# Patient Record
Sex: FEMALE | Race: WHITE | NOT HISPANIC OR LATINO | Employment: UNEMPLOYED | URBAN - METROPOLITAN AREA
[De-identification: names, ages, dates, MRNs, and addresses within clinical notes are randomized per-mention and may not be internally consistent; named-entity substitution may affect disease eponyms.]

---

## 2023-09-25 ENCOUNTER — APPOINTMENT (OUTPATIENT)
Dept: RADIOLOGY | Facility: MEDICAL CENTER | Age: 23
End: 2023-09-25
Attending: EMERGENCY MEDICINE

## 2023-09-25 ENCOUNTER — HOSPITAL ENCOUNTER (EMERGENCY)
Facility: MEDICAL CENTER | Age: 23
End: 2023-09-25
Attending: EMERGENCY MEDICINE
Payer: MEDICAID

## 2023-09-25 VITALS
WEIGHT: 195 LBS | OXYGEN SATURATION: 95 % | SYSTOLIC BLOOD PRESSURE: 103 MMHG | HEART RATE: 105 BPM | DIASTOLIC BLOOD PRESSURE: 50 MMHG | RESPIRATION RATE: 20 BRPM | TEMPERATURE: 98.4 F

## 2023-09-25 DIAGNOSIS — M54.32 SCIATICA OF LEFT SIDE: ICD-10-CM

## 2023-09-25 DIAGNOSIS — M48.061 LUMBAR FORAMINAL STENOSIS: ICD-10-CM

## 2023-09-25 LAB
ALBUMIN SERPL BCP-MCNC: 4.5 G/DL (ref 3.2–4.9)
ALBUMIN/GLOB SERPL: 1.4 G/DL
ALP SERPL-CCNC: 86 U/L (ref 30–99)
ALT SERPL-CCNC: 30 U/L (ref 2–50)
ANION GAP SERPL CALC-SCNC: 16 MMOL/L (ref 7–16)
AST SERPL-CCNC: 25 U/L (ref 12–45)
BASOPHILS # BLD AUTO: 0.7 % (ref 0–1.8)
BASOPHILS # BLD: 0.09 K/UL (ref 0–0.12)
BILIRUB SERPL-MCNC: 0.3 MG/DL (ref 0.1–1.5)
BUN SERPL-MCNC: 5 MG/DL (ref 8–22)
CALCIUM ALBUM COR SERPL-MCNC: 8.8 MG/DL (ref 8.5–10.5)
CALCIUM SERPL-MCNC: 9.2 MG/DL (ref 8.4–10.2)
CHLORIDE SERPL-SCNC: 100 MMOL/L (ref 96–112)
CO2 SERPL-SCNC: 20 MMOL/L (ref 20–33)
CREAT SERPL-MCNC: 0.83 MG/DL (ref 0.5–1.4)
EOSINOPHIL # BLD AUTO: 1.84 K/UL (ref 0–0.51)
EOSINOPHIL NFR BLD: 15.2 % (ref 0–6.9)
ERYTHROCYTE [DISTWIDTH] IN BLOOD BY AUTOMATED COUNT: 41.1 FL (ref 35.9–50)
GFR SERPLBLD CREATININE-BSD FMLA CKD-EPI: 102 ML/MIN/1.73 M 2
GLOBULIN SER CALC-MCNC: 3.3 G/DL (ref 1.9–3.5)
GLUCOSE SERPL-MCNC: 89 MG/DL (ref 65–99)
HCG SERPL QL: NEGATIVE
HCT VFR BLD AUTO: 46.8 % (ref 37–47)
HGB BLD-MCNC: 15.5 G/DL (ref 12–16)
IMM GRANULOCYTES # BLD AUTO: 0.05 K/UL (ref 0–0.11)
IMM GRANULOCYTES NFR BLD AUTO: 0.4 % (ref 0–0.9)
LYMPHOCYTES # BLD AUTO: 2.94 K/UL (ref 1–4.8)
LYMPHOCYTES NFR BLD: 24.4 % (ref 22–41)
MCH RBC QN AUTO: 29 PG (ref 27–33)
MCHC RBC AUTO-ENTMCNC: 33.1 G/DL (ref 32.2–35.5)
MCV RBC AUTO: 87.5 FL (ref 81.4–97.8)
MONOCYTES # BLD AUTO: 0.62 K/UL (ref 0–0.85)
MONOCYTES NFR BLD AUTO: 5.1 % (ref 0–13.4)
NEUTROPHILS # BLD AUTO: 6.53 K/UL (ref 1.82–7.42)
NEUTROPHILS NFR BLD: 54.2 % (ref 44–72)
NRBC # BLD AUTO: 0 K/UL
NRBC BLD-RTO: 0 /100 WBC (ref 0–0.2)
PLATELET # BLD AUTO: 321 K/UL (ref 164–446)
PMV BLD AUTO: 10 FL (ref 9–12.9)
POTASSIUM SERPL-SCNC: 3.7 MMOL/L (ref 3.6–5.5)
PROT SERPL-MCNC: 7.8 G/DL (ref 6–8.2)
RBC # BLD AUTO: 5.35 M/UL (ref 4.2–5.4)
SODIUM SERPL-SCNC: 136 MMOL/L (ref 135–145)
WBC # BLD AUTO: 12.1 K/UL (ref 4.8–10.8)

## 2023-09-25 PROCEDURE — A9270 NON-COVERED ITEM OR SERVICE: HCPCS | Performed by: EMERGENCY MEDICINE

## 2023-09-25 PROCEDURE — 99284 EMERGENCY DEPT VISIT MOD MDM: CPT

## 2023-09-25 PROCEDURE — 94760 N-INVAS EAR/PLS OXIMETRY 1: CPT

## 2023-09-25 PROCEDURE — 72131 CT LUMBAR SPINE W/O DYE: CPT

## 2023-09-25 PROCEDURE — 94640 AIRWAY INHALATION TREATMENT: CPT

## 2023-09-25 PROCEDURE — 36415 COLL VENOUS BLD VENIPUNCTURE: CPT

## 2023-09-25 PROCEDURE — 80053 COMPREHEN METABOLIC PANEL: CPT

## 2023-09-25 PROCEDURE — 700102 HCHG RX REV CODE 250 W/ 637 OVERRIDE(OP): Performed by: EMERGENCY MEDICINE

## 2023-09-25 PROCEDURE — 700101 HCHG RX REV CODE 250: Performed by: EMERGENCY MEDICINE

## 2023-09-25 PROCEDURE — 84703 CHORIONIC GONADOTROPIN ASSAY: CPT

## 2023-09-25 PROCEDURE — 85025 COMPLETE CBC W/AUTO DIFF WBC: CPT

## 2023-09-25 RX ORDER — GABAPENTIN 100 MG/1
100 CAPSULE ORAL 3 TIMES DAILY
Qty: 42 CAPSULE | Refills: 0 | Status: SHIPPED | OUTPATIENT
Start: 2023-09-25 | End: 2023-10-09

## 2023-09-25 RX ORDER — GABAPENTIN 100 MG/1
100 CAPSULE ORAL ONCE
Status: COMPLETED | OUTPATIENT
Start: 2023-09-25 | End: 2023-09-25

## 2023-09-25 RX ORDER — LIDOCAINE 50 MG/G
1 PATCH TOPICAL EVERY 24 HOURS
Status: DISCONTINUED | OUTPATIENT
Start: 2023-09-25 | End: 2023-09-26 | Stop reason: HOSPADM

## 2023-09-25 RX ADMIN — LIDOCAINE 1 PATCH: 50 PATCH TOPICAL at 21:57

## 2023-09-25 RX ADMIN — ALBUTEROL SULFATE 2.5 MG: 2.5 SOLUTION RESPIRATORY (INHALATION) at 23:09

## 2023-09-25 RX ADMIN — GABAPENTIN 100 MG: 100 CAPSULE ORAL at 21:57

## 2023-09-25 ASSESSMENT — PAIN DESCRIPTION - PAIN TYPE: TYPE: ACUTE PAIN

## 2023-09-26 NOTE — ED NOTES
Pt complaining of wheezes and difficulty breathing, Pt has audible wheeze, RLL and RUL +wheeze. ERP informed

## 2023-09-26 NOTE — ED PROVIDER NOTES
ED Provider Note    CHIEF COMPLAINT  Chief Complaint   Patient presents with    Low Back Pain     Pt presents to ed c/o low back pain that radiates to her left leg, reports this has been an intermittent problems since she gave birth, denies taking anything for pain pta, cough and congestion and wheeze noted mask applied in triage       EXTERNAL RECORDS REVIEWED  Other none available in this EMR    HPI/ROS  LIMITATION TO HISTORY   Select: : None  OUTSIDE HISTORIAN(S):  Significant other at bedside    Kristie Luciano is a 23 y.o. female who presents to the emergency department complaining of acute on chronic left low back pain.  Past medical history is largely benign with chronic symptoms for over a year status post delivery of her child roughly 16 months ago.  Over the last week however the pain has been more severe causing ambulatory difficulties which exacerbates the pain.  Pain is focal in the left low back with occasional shooting pain down the left leg.  No relief with over-the-counter medications.  Recently relocated from [facility] and now here in [facility] with no PCP.    Allergy to NSAIDs    PAST MEDICAL HISTORY       SURGICAL HISTORY  patient denies any surgical history    FAMILY HISTORY  No family history on file.    SOCIAL HISTORY  Social History     Tobacco Use    Smoking status: Not on file    Smokeless tobacco: Not on file   Substance and Sexual Activity    Alcohol use: Not on file    Drug use: Not on file    Sexual activity: Not on file       CURRENT MEDICATIONS  Home Medications       Reviewed by Shai Owens R.N. (Registered Nurse) on 09/25/23 at 2045  Med List Status: Not Addressed     Medication Last Dose Status        Patient Neri Taking any Medications                           ALLERGIES  Allergies   Allergen Reactions    Nsaids Hives       PHYSICAL EXAM  VITAL SIGNS: /50   Pulse (!) 120   Temp 36.9 °C (98.4 °F) (Temporal)   Resp 20   Wt 88.5 kg (195 lb)    LMP 09/01/2023   SpO2 95%      Pulse ox interpretation: I interpret this pulse ox as normal.  Constitutional: Alert in no apparent distress.  HENT: No signs of trauma, Bilateral external ears normal, Nose normal.   Eyes: Pupils are equal and reactive  Neck: Normal range of motion, No tenderness, Supple  Cardiovascular: Regular rate and rhythm, no murmurs.   Thorax & Lungs: Normal breath sounds, No respiratory distress, No wheezing, No chest tenderness.   Abdomen: Bowel sounds normal, Soft, No tenderness.  Overweight.  Skin: Warm, Dry, No erythema, No rash.   Back: No bony tenderness.  Left joint tenderness with positive straight leg raise on the left  Extremities: Intact distal pulses  Musculoskeletal: Good range of motion in all major joints. No tenderness to palpation or major deformities noted.   Neurologic: Alert , Normal motor function, Normal sensory function, No focal deficits noted.   Psychiatric: Affect normal, Judgment normal, Mood normal.         DIAGNOSTIC STUDIES / PROCEDURES    LABS  Results for orders placed or performed during the hospital encounter of 09/25/23   CBC WITH DIFFERENTIAL   Result Value Ref Range    WBC 12.1 (H) 4.8 - 10.8 K/uL    RBC 5.35 4.20 - 5.40 M/uL    Hemoglobin 15.5 12.0 - 16.0 g/dL    Hematocrit 46.8 37.0 - 47.0 %    MCV 87.5 81.4 - 97.8 fL    MCH 29.0 27.0 - 33.0 pg    MCHC 33.1 32.2 - 35.5 g/dL    RDW 41.1 35.9 - 50.0 fL    Platelet Count 321 164 - 446 K/uL    MPV 10.0 9.0 - 12.9 fL    Neutrophils-Polys 54.20 44.00 - 72.00 %    Lymphocytes 24.40 22.00 - 41.00 %    Monocytes 5.10 0.00 - 13.40 %    Eosinophils 15.20 (H) 0.00 - 6.90 %    Basophils 0.70 0.00 - 1.80 %    Immature Granulocytes 0.40 0.00 - 0.90 %    Nucleated RBC 0.00 0.00 - 0.20 /100 WBC    Neutrophils (Absolute) 6.53 1.82 - 7.42 K/uL    Lymphs (Absolute) 2.94 1.00 - 4.80 K/uL    Monos (Absolute) 0.62 0.00 - 0.85 K/uL    Eos (Absolute) 1.84 (H) 0.00 - 0.51 K/uL    Baso (Absolute) 0.09 0.00 - 0.12 K/uL     Immature Granulocytes (abs) 0.05 0.00 - 0.11 K/uL    NRBC (Absolute) 0.00 K/uL   COMP METABOLIC PANEL   Result Value Ref Range    Sodium 136 135 - 145 mmol/L    Potassium 3.7 3.6 - 5.5 mmol/L    Chloride 100 96 - 112 mmol/L    Co2 20 20 - 33 mmol/L    Anion Gap 16.0 7.0 - 16.0    Glucose 89 65 - 99 mg/dL    Bun 5 (L) 8 - 22 mg/dL    Creatinine 0.83 0.50 - 1.40 mg/dL    Calcium 9.2 8.4 - 10.2 mg/dL    Correct Calcium 8.8 8.5 - 10.5 mg/dL    AST(SGOT) 25 12 - 45 U/L    ALT(SGPT) 30 2 - 50 U/L    Alkaline Phosphatase 86 30 - 99 U/L    Total Bilirubin 0.3 0.1 - 1.5 mg/dL    Albumin 4.5 3.2 - 4.9 g/dL    Total Protein 7.8 6.0 - 8.2 g/dL    Globulin 3.3 1.9 - 3.5 g/dL    A-G Ratio 1.4 g/dL   HCG QUAL SERUM   Result Value Ref Range    Beta-Hcg Qualitative Serum Negative Negative   ESTIMATED GFR   Result Value Ref Range    GFR (CKD-EPI) 102 >60 mL/min/1.73 m 2         RADIOLOGY  I have independently interpreted the diagnostic imaging associated with this visit and am waiting the final reading from the radiologist.   My preliminary interpretation is as follows: no large central stenosis  Radiologist interpretation:   CT-LSPINE W/O PLUS RECONS   Final Result         1.  No acute traumatic bony injury of the lumbar spine.   2.  Moderate bilateral neural foraminal stenosis at L5/S1.            COURSE & MEDICAL DECISION MAKING    ED Observation Status? No; Patient does not meet criteria for ED Observation.     INITIAL ASSESSMENT, COURSE AND PLAN  Care Narrative: 23-year-old female to the emerged part with acute Back pain and sciatica-like symptoms.  We will proceed with hematologic evaluation and imaging given lack of the same previously.  DISPOSITION AND DISCUSSIONS  I have discussed management of the patient with the following physicians and MIKEY's: None    Discussion of management with other [facility] or appropriate source(s): Pharmacy for medication verification      Escalation of care considered, and ultimately not performed:acute  inpatient care management, however at this time, the patient is most appropriate for outpatient management    Barriers to care at this time, including but not limited to: Patient does not have established PCP.     Decision tools and prescription drugs considered including, but not limited to:  We will have patient continue with Lidoderm patches and gabapentin in addition to Tylenol.  We will continue to avoid NSAIDs given history of allergy .    23-year-old female presented emerged part with acute on chronic back pain and sciatica.  History as above.  CT imaging does show moderate neuroforaminal stenosis at L5-S1.  Again clinically I have a high suspicion for sciatic-like symptomatology.  I do not believe that there are more ominous pathologies to include cauda equina or other acute central neurologic inflammatory processes.  I will have her continue on the medication course as outlined above.  She will be referred to local PCP.  She is understanding return precautions to the ER if needed.      FINAL DIAGNOSIS  1. Sciatica of left side    2. Lumbar foraminal stenosis           Electronically signed by: Johnson Villegas M.D., 9/25/2023 9:45 PM

## 2023-09-26 NOTE — ED TRIAGE NOTES
Chief Complaint   Patient presents with    Low Back Pain     Pt presents to ed c/o low back pain that radiates to her left leg, reports this has been an intermittent problems since she gave birth, denies taking anything for pain pta, cough and congestion and wheeze noted mask applied in triage     BP (!) 142/51   Pulse (!) 131   Temp 37 °C (98.6 °F)   Resp 18   Wt 88.5 kg (195 lb)   LMP 09/01/2023   SpO2 93%

## 2023-09-26 NOTE — ED NOTES
Pt Aox4, came via wheelchair, not in any form of distress. Pt laying uncomfortably in bed, warm blankets provided, side rails raised up, bed locked. Call bell within reach.       Complaints of back pain 10/10.  at bedside

## 2023-11-28 ENCOUNTER — APPOINTMENT (OUTPATIENT)
Dept: RADIOLOGY | Facility: MEDICAL CENTER | Age: 23
DRG: 519 | End: 2023-11-28
Attending: STUDENT IN AN ORGANIZED HEALTH CARE EDUCATION/TRAINING PROGRAM
Payer: MEDICAID

## 2023-11-28 ENCOUNTER — HOSPITAL ENCOUNTER (INPATIENT)
Facility: MEDICAL CENTER | Age: 23
LOS: 3 days | DRG: 519 | End: 2023-12-03
Attending: EMERGENCY MEDICINE | Admitting: STUDENT IN AN ORGANIZED HEALTH CARE EDUCATION/TRAINING PROGRAM
Payer: MEDICAID

## 2023-11-28 DIAGNOSIS — M51.36 BULGING OF INTERVERTEBRAL DISC BETWEEN L4 AND L5: ICD-10-CM

## 2023-11-28 DIAGNOSIS — E66.01 SEVERE OBESITY (HCC): ICD-10-CM

## 2023-11-28 DIAGNOSIS — M54.50 LUMBAR BACK PAIN: ICD-10-CM

## 2023-11-28 DIAGNOSIS — J45.20 MILD INTERMITTENT ASTHMA WITHOUT COMPLICATION: ICD-10-CM

## 2023-11-28 DIAGNOSIS — M54.9 INTRACTABLE BACK PAIN: ICD-10-CM

## 2023-11-28 PROBLEM — E66.9 OBESE: Status: ACTIVE | Noted: 2023-11-28

## 2023-11-28 LAB
ALBUMIN SERPL BCP-MCNC: 4.5 G/DL (ref 3.2–4.9)
ALBUMIN/GLOB SERPL: 1.6 G/DL
ALP SERPL-CCNC: 74 U/L (ref 30–99)
ALT SERPL-CCNC: 35 U/L (ref 2–50)
ANION GAP SERPL CALC-SCNC: 11 MMOL/L (ref 7–16)
AST SERPL-CCNC: 32 U/L (ref 12–45)
BASOPHILS # BLD AUTO: 0.3 % (ref 0–1.8)
BASOPHILS # BLD: 0.03 K/UL (ref 0–0.12)
BILIRUB SERPL-MCNC: 0.3 MG/DL (ref 0.1–1.5)
BUN SERPL-MCNC: 4 MG/DL (ref 8–22)
CALCIUM ALBUM COR SERPL-MCNC: 9.1 MG/DL (ref 8.5–10.5)
CALCIUM SERPL-MCNC: 9.5 MG/DL (ref 8.5–10.5)
CHLORIDE SERPL-SCNC: 107 MMOL/L (ref 96–112)
CO2 SERPL-SCNC: 21 MMOL/L (ref 20–33)
CREAT SERPL-MCNC: 0.62 MG/DL (ref 0.5–1.4)
EOSINOPHIL # BLD AUTO: 0.1 K/UL (ref 0–0.51)
EOSINOPHIL NFR BLD: 1.1 % (ref 0–6.9)
ERYTHROCYTE [DISTWIDTH] IN BLOOD BY AUTOMATED COUNT: 41 FL (ref 35.9–50)
GFR SERPLBLD CREATININE-BSD FMLA CKD-EPI: 128 ML/MIN/1.73 M 2
GLOBULIN SER CALC-MCNC: 2.9 G/DL (ref 1.9–3.5)
GLUCOSE SERPL-MCNC: 106 MG/DL (ref 65–99)
HCG SERPL QL: NEGATIVE
HCT VFR BLD AUTO: 48.7 % (ref 37–47)
HGB BLD-MCNC: 16.3 G/DL (ref 12–16)
IMM GRANULOCYTES # BLD AUTO: 0.03 K/UL (ref 0–0.11)
IMM GRANULOCYTES NFR BLD AUTO: 0.3 % (ref 0–0.9)
LYMPHOCYTES # BLD AUTO: 1.02 K/UL (ref 1–4.8)
LYMPHOCYTES NFR BLD: 11.6 % (ref 22–41)
MCH RBC QN AUTO: 28.9 PG (ref 27–33)
MCHC RBC AUTO-ENTMCNC: 33.5 G/DL (ref 32.2–35.5)
MCV RBC AUTO: 86.3 FL (ref 81.4–97.8)
MONOCYTES # BLD AUTO: 0.13 K/UL (ref 0–0.85)
MONOCYTES NFR BLD AUTO: 1.5 % (ref 0–13.4)
NEUTROPHILS # BLD AUTO: 7.46 K/UL (ref 1.82–7.42)
NEUTROPHILS NFR BLD: 85.2 % (ref 44–72)
NRBC # BLD AUTO: 0 K/UL
NRBC BLD-RTO: 0 /100 WBC (ref 0–0.2)
PLATELET # BLD AUTO: 324 K/UL (ref 164–446)
PMV BLD AUTO: 10.2 FL (ref 9–12.9)
POTASSIUM SERPL-SCNC: 4.2 MMOL/L (ref 3.6–5.5)
PROT SERPL-MCNC: 7.4 G/DL (ref 6–8.2)
RBC # BLD AUTO: 5.64 M/UL (ref 4.2–5.4)
SODIUM SERPL-SCNC: 139 MMOL/L (ref 135–145)
WBC # BLD AUTO: 8.8 K/UL (ref 4.8–10.8)

## 2023-11-28 PROCEDURE — 80053 COMPREHEN METABOLIC PANEL: CPT

## 2023-11-28 PROCEDURE — A9270 NON-COVERED ITEM OR SERVICE: HCPCS | Performed by: STUDENT IN AN ORGANIZED HEALTH CARE EDUCATION/TRAINING PROGRAM

## 2023-11-28 PROCEDURE — 96374 THER/PROPH/DIAG INJ IV PUSH: CPT

## 2023-11-28 PROCEDURE — 96376 TX/PRO/DX INJ SAME DRUG ADON: CPT

## 2023-11-28 PROCEDURE — 99223 1ST HOSP IP/OBS HIGH 75: CPT | Mod: AI | Performed by: STUDENT IN AN ORGANIZED HEALTH CARE EDUCATION/TRAINING PROGRAM

## 2023-11-28 PROCEDURE — G0378 HOSPITAL OBSERVATION PER HR: HCPCS

## 2023-11-28 PROCEDURE — 36415 COLL VENOUS BLD VENIPUNCTURE: CPT

## 2023-11-28 PROCEDURE — 700102 HCHG RX REV CODE 250 W/ 637 OVERRIDE(OP): Performed by: STUDENT IN AN ORGANIZED HEALTH CARE EDUCATION/TRAINING PROGRAM

## 2023-11-28 PROCEDURE — 72100 X-RAY EXAM L-S SPINE 2/3 VWS: CPT

## 2023-11-28 PROCEDURE — 96375 TX/PRO/DX INJ NEW DRUG ADDON: CPT

## 2023-11-28 PROCEDURE — 84703 CHORIONIC GONADOTROPIN ASSAY: CPT

## 2023-11-28 PROCEDURE — 85025 COMPLETE CBC W/AUTO DIFF WBC: CPT

## 2023-11-28 PROCEDURE — 700111 HCHG RX REV CODE 636 W/ 250 OVERRIDE (IP): Mod: JZ | Performed by: EMERGENCY MEDICINE

## 2023-11-28 PROCEDURE — 99285 EMERGENCY DEPT VISIT HI MDM: CPT

## 2023-11-28 RX ORDER — ONDANSETRON 2 MG/ML
4 INJECTION INTRAMUSCULAR; INTRAVENOUS ONCE
Status: COMPLETED | OUTPATIENT
Start: 2023-11-28 | End: 2023-11-28

## 2023-11-28 RX ORDER — OXYCODONE HYDROCHLORIDE 5 MG/1
5 TABLET ORAL EVERY 4 HOURS PRN
Status: DISCONTINUED | OUTPATIENT
Start: 2023-11-28 | End: 2023-11-29

## 2023-11-28 RX ORDER — DEXAMETHASONE SODIUM PHOSPHATE 10 MG/ML
10 INJECTION, SOLUTION INTRAMUSCULAR; INTRAVENOUS ONCE
Status: COMPLETED | OUTPATIENT
Start: 2023-11-28 | End: 2023-11-28

## 2023-11-28 RX ORDER — AMOXICILLIN 250 MG
2 CAPSULE ORAL 2 TIMES DAILY
Status: DISCONTINUED | OUTPATIENT
Start: 2023-11-28 | End: 2023-12-03 | Stop reason: HOSPADM

## 2023-11-28 RX ORDER — KETOROLAC TROMETHAMINE 30 MG/ML
30 INJECTION, SOLUTION INTRAMUSCULAR; INTRAVENOUS EVERY 6 HOURS PRN
Status: DISPENSED | OUTPATIENT
Start: 2023-11-28 | End: 2023-11-29

## 2023-11-28 RX ORDER — ENOXAPARIN SODIUM 100 MG/ML
40 INJECTION SUBCUTANEOUS DAILY
Status: DISCONTINUED | OUTPATIENT
Start: 2023-11-28 | End: 2023-12-03

## 2023-11-28 RX ORDER — ACETAMINOPHEN 325 MG/1
650 TABLET ORAL EVERY 6 HOURS PRN
Status: DISCONTINUED | OUTPATIENT
Start: 2023-11-28 | End: 2023-12-01

## 2023-11-28 RX ORDER — MORPHINE SULFATE 4 MG/ML
4 INJECTION INTRAVENOUS ONCE
Status: COMPLETED | OUTPATIENT
Start: 2023-11-28 | End: 2023-11-28

## 2023-11-28 RX ORDER — POLYETHYLENE GLYCOL 3350 17 G/17G
1 POWDER, FOR SOLUTION ORAL
Status: DISCONTINUED | OUTPATIENT
Start: 2023-11-28 | End: 2023-12-03 | Stop reason: HOSPADM

## 2023-11-28 RX ORDER — ACETAMINOPHEN 325 MG/1
650 TABLET ORAL EVERY 4 HOURS PRN
Status: ON HOLD | COMMUNITY
End: 2023-12-03 | Stop reason: SDUPTHER

## 2023-11-28 RX ORDER — BISACODYL 10 MG
10 SUPPOSITORY, RECTAL RECTAL
Status: DISCONTINUED | OUTPATIENT
Start: 2023-11-28 | End: 2023-12-03 | Stop reason: HOSPADM

## 2023-11-28 RX ORDER — CYCLOBENZAPRINE HCL 10 MG
10 TABLET ORAL 3 TIMES DAILY PRN
Status: DISCONTINUED | OUTPATIENT
Start: 2023-11-28 | End: 2023-12-01

## 2023-11-28 RX ADMIN — ONDANSETRON 4 MG: 2 INJECTION INTRAMUSCULAR; INTRAVENOUS at 20:53

## 2023-11-28 RX ADMIN — MORPHINE SULFATE 4 MG: 4 INJECTION, SOLUTION INTRAMUSCULAR; INTRAVENOUS at 21:29

## 2023-11-28 RX ADMIN — CYCLOBENZAPRINE 10 MG: 10 TABLET, FILM COATED ORAL at 23:48

## 2023-11-28 RX ADMIN — MORPHINE SULFATE 4 MG: 4 INJECTION, SOLUTION INTRAMUSCULAR; INTRAVENOUS at 20:52

## 2023-11-28 RX ADMIN — DEXAMETHASONE SODIUM PHOSPHATE 10 MG: 10 INJECTION, SOLUTION INTRAMUSCULAR; INTRAVENOUS at 20:53

## 2023-11-28 ASSESSMENT — ENCOUNTER SYMPTOMS
BACK PAIN: 1
CHILLS: 0
VOMITING: 0
HEMOPTYSIS: 0
PALPITATIONS: 0
SPUTUM PRODUCTION: 0
HEADACHES: 0
SHORTNESS OF BREATH: 0
NECK PAIN: 0
ORTHOPNEA: 0
DEPRESSION: 0
BLURRED VISION: 0
SINUS PAIN: 0
DOUBLE VISION: 0
ABDOMINAL PAIN: 0
NAUSEA: 0
DIZZINESS: 0
FEVER: 0
DIARRHEA: 0

## 2023-11-28 ASSESSMENT — LIFESTYLE VARIABLES
DOES PATIENT WANT TO STOP DRINKING: NO
EVER FELT BAD OR GUILTY ABOUT YOUR DRINKING: NO
AVERAGE NUMBER OF DAYS PER WEEK YOU HAVE A DRINK CONTAINING ALCOHOL: 3
TOTAL SCORE: 0
ON A TYPICAL DAY WHEN YOU DRINK ALCOHOL HOW MANY DRINKS DO YOU HAVE: 1
HAVE YOU EVER FELT YOU SHOULD CUT DOWN ON YOUR DRINKING: NO
SUBSTANCE_ABUSE: 0
HAVE PEOPLE ANNOYED YOU BY CRITICIZING YOUR DRINKING: NO
ALCOHOL_USE: YES
CONSUMPTION TOTAL: NEGATIVE
EVER HAD A DRINK FIRST THING IN THE MORNING TO STEADY YOUR NERVES TO GET RID OF A HANGOVER: NO
HOW MANY TIMES IN THE PAST YEAR HAVE YOU HAD 5 OR MORE DRINKS IN A DAY: 0

## 2023-11-28 ASSESSMENT — PATIENT HEALTH QUESTIONNAIRE - PHQ9
1. LITTLE INTEREST OR PLEASURE IN DOING THINGS: NOT AT ALL
SUM OF ALL RESPONSES TO PHQ9 QUESTIONS 1 AND 2: 0
2. FEELING DOWN, DEPRESSED, IRRITABLE, OR HOPELESS: NOT AT ALL

## 2023-11-28 ASSESSMENT — PAIN DESCRIPTION - PAIN TYPE
TYPE: ACUTE PAIN
TYPE: CHRONIC PAIN

## 2023-11-29 ENCOUNTER — APPOINTMENT (OUTPATIENT)
Dept: RADIOLOGY | Facility: MEDICAL CENTER | Age: 23
DRG: 519 | End: 2023-11-29
Attending: HOSPITALIST
Payer: MEDICAID

## 2023-11-29 PROCEDURE — 72148 MRI LUMBAR SPINE W/O DYE: CPT

## 2023-11-29 PROCEDURE — 96376 TX/PRO/DX INJ SAME DRUG ADON: CPT

## 2023-11-29 PROCEDURE — A9270 NON-COVERED ITEM OR SERVICE: HCPCS | Performed by: STUDENT IN AN ORGANIZED HEALTH CARE EDUCATION/TRAINING PROGRAM

## 2023-11-29 PROCEDURE — 96372 THER/PROPH/DIAG INJ SC/IM: CPT

## 2023-11-29 PROCEDURE — A9270 NON-COVERED ITEM OR SERVICE: HCPCS | Performed by: HOSPITALIST

## 2023-11-29 PROCEDURE — 700111 HCHG RX REV CODE 636 W/ 250 OVERRIDE (IP): Mod: JZ | Performed by: STUDENT IN AN ORGANIZED HEALTH CARE EDUCATION/TRAINING PROGRAM

## 2023-11-29 PROCEDURE — 700102 HCHG RX REV CODE 250 W/ 637 OVERRIDE(OP)

## 2023-11-29 PROCEDURE — 96375 TX/PRO/DX INJ NEW DRUG ADDON: CPT

## 2023-11-29 PROCEDURE — 700102 HCHG RX REV CODE 250 W/ 637 OVERRIDE(OP): Performed by: STUDENT IN AN ORGANIZED HEALTH CARE EDUCATION/TRAINING PROGRAM

## 2023-11-29 PROCEDURE — A9270 NON-COVERED ITEM OR SERVICE: HCPCS

## 2023-11-29 PROCEDURE — G0378 HOSPITAL OBSERVATION PER HR: HCPCS

## 2023-11-29 PROCEDURE — 700101 HCHG RX REV CODE 250

## 2023-11-29 PROCEDURE — 700111 HCHG RX REV CODE 636 W/ 250 OVERRIDE (IP)

## 2023-11-29 PROCEDURE — 700102 HCHG RX REV CODE 250 W/ 637 OVERRIDE(OP): Performed by: HOSPITALIST

## 2023-11-29 PROCEDURE — 99232 SBSQ HOSP IP/OBS MODERATE 35: CPT | Performed by: HOSPITALIST

## 2023-11-29 RX ORDER — LIDOCAINE 4 G/G
2 PATCH TOPICAL EVERY 24 HOURS
Status: DISCONTINUED | OUTPATIENT
Start: 2023-11-29 | End: 2023-12-03 | Stop reason: HOSPADM

## 2023-11-29 RX ORDER — OXYCODONE HYDROCHLORIDE 5 MG/1
5 TABLET ORAL
Status: DISCONTINUED | OUTPATIENT
Start: 2023-11-29 | End: 2023-12-03

## 2023-11-29 RX ORDER — METHYLPREDNISOLONE 4 MG/1
4 TABLET ORAL 2 TIMES DAILY WITH MEALS
Status: DISCONTINUED | OUTPATIENT
Start: 2023-12-03 | End: 2023-12-01

## 2023-11-29 RX ORDER — IBUPROFEN 400 MG/1
600 TABLET ORAL 3 TIMES DAILY
Status: DISCONTINUED | OUTPATIENT
Start: 2023-11-29 | End: 2023-12-03 | Stop reason: HOSPADM

## 2023-11-29 RX ORDER — METHYLPREDNISOLONE 4 MG/1
4 TABLET ORAL
Status: DISCONTINUED | OUTPATIENT
Start: 2023-12-02 | End: 2023-12-01

## 2023-11-29 RX ORDER — METHYLPREDNISOLONE 4 MG/1
4 TABLET ORAL
Status: DISCONTINUED | OUTPATIENT
Start: 2023-12-01 | End: 2023-12-01

## 2023-11-29 RX ORDER — HYDROMORPHONE HYDROCHLORIDE 1 MG/ML
0.5 INJECTION, SOLUTION INTRAMUSCULAR; INTRAVENOUS; SUBCUTANEOUS
Status: DISCONTINUED | OUTPATIENT
Start: 2023-11-29 | End: 2023-12-03

## 2023-11-29 RX ORDER — METHYLPREDNISOLONE 4 MG/1
4 TABLET ORAL
Status: COMPLETED | OUTPATIENT
Start: 2023-11-30 | End: 2023-11-30

## 2023-11-29 RX ORDER — OXYCODONE HYDROCHLORIDE 10 MG/1
10 TABLET ORAL
Status: DISCONTINUED | OUTPATIENT
Start: 2023-11-29 | End: 2023-12-03

## 2023-11-29 RX ORDER — METHYLPREDNISOLONE 4 MG/1
8 TABLET ORAL
Status: COMPLETED | OUTPATIENT
Start: 2023-11-30 | End: 2023-11-30

## 2023-11-29 RX ORDER — GABAPENTIN 300 MG/1
300 CAPSULE ORAL 3 TIMES DAILY
Status: DISCONTINUED | OUTPATIENT
Start: 2023-11-29 | End: 2023-11-29

## 2023-11-29 RX ORDER — PREGABALIN 25 MG/1
25 CAPSULE ORAL 3 TIMES DAILY
Status: DISCONTINUED | OUTPATIENT
Start: 2023-11-29 | End: 2023-12-03 | Stop reason: HOSPADM

## 2023-11-29 RX ADMIN — HYDROMORPHONE HYDROCHLORIDE 0.5 MG: 1 INJECTION, SOLUTION INTRAMUSCULAR; INTRAVENOUS; SUBCUTANEOUS at 19:47

## 2023-11-29 RX ADMIN — OXYCODONE HYDROCHLORIDE 10 MG: 10 TABLET ORAL at 12:30

## 2023-11-29 RX ADMIN — KETOROLAC TROMETHAMINE 30 MG: 30 INJECTION, SOLUTION INTRAMUSCULAR; INTRAVENOUS at 00:24

## 2023-11-29 RX ADMIN — HYDROMORPHONE HYDROCHLORIDE 0.5 MG: 1 INJECTION, SOLUTION INTRAMUSCULAR; INTRAVENOUS; SUBCUTANEOUS at 04:38

## 2023-11-29 RX ADMIN — ENOXAPARIN SODIUM 40 MG: 100 INJECTION SUBCUTANEOUS at 17:58

## 2023-11-29 RX ADMIN — KETOROLAC TROMETHAMINE 30 MG: 30 INJECTION, SOLUTION INTRAMUSCULAR; INTRAVENOUS at 07:24

## 2023-11-29 RX ADMIN — METHYLPREDNISOLONE 24 MG: 16 TABLET ORAL at 12:33

## 2023-11-29 RX ADMIN — OXYCODONE 5 MG: 5 TABLET ORAL at 03:04

## 2023-11-29 RX ADMIN — OXYCODONE HYDROCHLORIDE 10 MG: 10 TABLET ORAL at 22:43

## 2023-11-29 RX ADMIN — LIDOCAINE 2 PATCH: 4 PATCH TOPICAL at 04:38

## 2023-11-29 RX ADMIN — PREGABALIN 25 MG: 25 CAPSULE ORAL at 12:31

## 2023-11-29 RX ADMIN — OXYCODONE HYDROCHLORIDE 10 MG: 10 TABLET ORAL at 09:01

## 2023-11-29 RX ADMIN — ENOXAPARIN SODIUM 40 MG: 100 INJECTION SUBCUTANEOUS at 00:25

## 2023-11-29 RX ADMIN — OXYCODONE HYDROCHLORIDE 10 MG: 10 TABLET ORAL at 17:58

## 2023-11-29 RX ADMIN — IBUPROFEN 600 MG: 400 TABLET, FILM COATED ORAL at 12:30

## 2023-11-29 RX ADMIN — CYCLOBENZAPRINE 10 MG: 10 TABLET, FILM COATED ORAL at 22:44

## 2023-11-29 RX ADMIN — PREGABALIN 25 MG: 25 CAPSULE ORAL at 17:58

## 2023-11-29 ASSESSMENT — COGNITIVE AND FUNCTIONAL STATUS - GENERAL
SUGGESTED CMS G CODE MODIFIER DAILY ACTIVITY: CH
SUGGESTED CMS G CODE MODIFIER MOBILITY: CH
DAILY ACTIVITIY SCORE: 24
MOBILITY SCORE: 24

## 2023-11-29 ASSESSMENT — ENCOUNTER SYMPTOMS
ORTHOPNEA: 0
WEIGHT LOSS: 0
NERVOUS/ANXIOUS: 0
PHOTOPHOBIA: 0
VOMITING: 0
NAUSEA: 0
HEARTBURN: 0
TREMORS: 0
BLURRED VISION: 0
MYALGIAS: 1
ABDOMINAL PAIN: 0
CONSTIPATION: 0
DIZZINESS: 0
FOCAL WEAKNESS: 0
PALPITATIONS: 0
DEPRESSION: 0
HEMOPTYSIS: 0
HALLUCINATIONS: 0
COUGH: 0
CHILLS: 0
SPEECH CHANGE: 0
FEVER: 0
TINGLING: 0
DOUBLE VISION: 0
SENSORY CHANGE: 0
BACK PAIN: 1
HEADACHES: 0
DIARRHEA: 0

## 2023-11-29 ASSESSMENT — PAIN DESCRIPTION - PAIN TYPE
TYPE: CHRONIC PAIN
TYPE: ACUTE PAIN
TYPE: CHRONIC PAIN

## 2023-11-29 ASSESSMENT — LIFESTYLE VARIABLES: SUBSTANCE_ABUSE: 0

## 2023-11-29 NOTE — ASSESSMENT & PLAN NOTE
Multimodal pain management  NSAID: Scheduled ibuprofen 600 mg TID  Steroid: Dexamethasone 4 mg BID x7 days  APAP: Scheduled 1000 mg TID  Anti-neuropathic: Lyrica 25 mg TID (intolerant of gabapentin)  Anti-spasmodic: Flexeril 10 mg TID  Topical: Lidoderm patch q24h  Temperature: Warm pack  Opiate PRN: Oxycodone 5-10 mg q3h PRN + IV Hydromorphone PRN for breakthrough  PT/OT Eval and Treat

## 2023-11-29 NOTE — H&P
Hospital Medicine History & Physical Note    Date of Service  11/28/2023    Primary Care Physician  Pcp Pt States None        Code Status  Full Code    Chief Complaint  Chief Complaint   Patient presents with    Back Pain     Lower back pain radiating to left leg       History of Presenting Illness  Hoda Summers is a 23 y.o. female who presented 11/28/2023 with intractable back pain.  Patient states that she has had back pain since April 22 after the birth of her son.  However ever since this morning, she has had worsening back pain.  She states that she is unable to ambulate.  As result she presented to the emergency department.  She was given multiple doses of IV pain medication without any improvement.  She denies any bladder or stool incontinence.  She does have some pain radiating down her left leg.  Patient will be admitted for management of intractable back pain.    I discussed the plan of care with patient.    Review of Systems  Review of Systems   Constitutional:  Negative for chills and fever.   HENT:  Negative for congestion and sinus pain.    Eyes:  Negative for blurred vision and double vision.   Respiratory:  Negative for hemoptysis, sputum production and shortness of breath.    Cardiovascular:  Negative for chest pain, palpitations and orthopnea.   Gastrointestinal:  Negative for abdominal pain, diarrhea, nausea and vomiting.   Genitourinary:  Negative for dysuria, frequency and urgency.   Musculoskeletal:  Positive for back pain. Negative for neck pain.   Neurological:  Negative for dizziness and headaches.   Psychiatric/Behavioral:  Negative for depression, substance abuse and suicidal ideas.        Past Medical History   has no past medical history on file.    Surgical History   has no past surgical history on file.     Family History  family history is not on file.   Family history reviewed with patient. There is no family history that is pertinent to the chief complaint.     Social  History       Allergies  Not on File    Medications  None       Physical Exam  Temp:  [35.8 °C (96.5 °F)] 35.8 °C (96.5 °F)  Pulse:  [68-75] 75  Resp:  [17] 17  BP: (130-131)/(73-75) 131/73  SpO2:  [92 %] 92 %  Blood Pressure: 131/73   Temperature: 35.8 °C (96.5 °F)   Pulse: 75   Respiration: 17   Pulse Oximetry: 92 %       Physical Exam  Constitutional:       General: She is in acute distress.      Appearance: Normal appearance. She is obese. She is not ill-appearing, toxic-appearing or diaphoretic.   HENT:      Head: Normocephalic and atraumatic.      Nose: Nose normal.      Mouth/Throat:      Mouth: Mucous membranes are moist.   Eyes:      Extraocular Movements: Extraocular movements intact.      Pupils: Pupils are equal, round, and reactive to light.   Cardiovascular:      Rate and Rhythm: Normal rate and regular rhythm.      Pulses: Normal pulses.      Heart sounds: Normal heart sounds. No murmur heard.     No friction rub. No gallop.   Pulmonary:      Effort: Pulmonary effort is normal. No respiratory distress.      Breath sounds: No stridor. No wheezing, rhonchi or rales.   Chest:      Chest wall: No tenderness.   Abdominal:      General: Abdomen is flat. There is no distension.      Palpations: Abdomen is soft. There is no mass.      Tenderness: There is no abdominal tenderness. There is no guarding or rebound.      Hernia: No hernia is present.   Musculoskeletal:         General: No swelling, tenderness, deformity or signs of injury.      Right lower leg: No edema.      Left lower leg: No edema.      Comments:      Skin:     General: Skin is warm and dry.      Capillary Refill: Capillary refill takes less than 2 seconds.      Coloration: Skin is not jaundiced or pale.      Findings: No bruising, erythema, lesion or rash.   Neurological:      General: No focal deficit present.      Mental Status: She is alert. Mental status is at baseline. She is disoriented.      Cranial Nerves: No cranial nerve deficit.     "  Sensory: No sensory deficit.      Motor: No weakness.      Coordination: Coordination normal.   Psychiatric:         Mood and Affect: Mood normal.         Behavior: Behavior normal.         Laboratory:          No results for input(s): \"ALTSGPT\", \"ASTSGOT\", \"ALKPHOSPHAT\", \"TBILIRUBIN\", \"DBILIRUBIN\", \"GAMMAGT\", \"AMYLASE\", \"LIPASE\", \"ALB\", \"PREALBUMIN\", \"GLUCOSE\" in the last 72 hours.      No results for input(s): \"NTPROBNP\" in the last 72 hours.      No results for input(s): \"TROPONINT\" in the last 72 hours.    Imaging:  DX-LUMBAR SPINE-2 OR 3 VIEWS    (Results Pending)       no X-Ray or EKG requiring interpretation    Assessment/Plan:  Justification for Admission Status  I anticipate this patient is appropriate for observation status at this time because management of intractable back pain    Patient will need a Med/Surg bed on MEDICAL service .  The need is secondary to management of intractable back pain blood was not drawn.    * Intractable back pain- (present on admission)  Assessment & Plan  Patient with known history of back pain, who presents with 1 day of worsening back pain.  Patient states that the pain is located on the left lower back, radiates down her left leg.  Positive straight leg test.  No saddle signs.  Patient received multiple doses of IV pain medication in the emergency department without any resolution  Patient will be admitted for both IV and p.o. management of intractable back pain.  Patient will need subsequent hemodynamic monitoring as she will be started on opioids  Follow-up x-ray  No negation for MRI at this time    Obese  Assessment & Plan  Talk to patient about weight loss strategies including diet and exercise.  She will need outpatient follow-up with primary care physician.        VTE prophylaxis: enoxaparin ppx  "

## 2023-11-29 NOTE — CARE PLAN
The patient is Stable - Low risk of patient condition declining or worsening    Shift Goals  Clinical Goals: Monitor V/S,  labs, pain control  Patient Goals: Comfort  Family Goals: IVAN    Progress made toward(s) clinical / shift goals:      Patient is not progressing towards the following goals: PRN Pain management needed.      Problem: Pain - Standard  Goal: Alleviation of pain or a reduction in pain to the patient’s comfort goal  Outcome: Progressing     Problem: Knowledge Deficit - Standard  Goal: Patient and family/care givers will demonstrate understanding of plan of care, disease process/condition, diagnostic tests and medications  Outcome: Progressing     Problem: Fall Risk  Goal: Patient will remain free from falls  Outcome: Progressing     Problem: Mobility  Goal: Patient's capacity to carry out activities will improve  Outcome: Progressing

## 2023-11-29 NOTE — ED TRIAGE NOTES
Chief Complaint   Patient presents with    Back Pain     Lower back pain radiating to left leg     Pt BIBA with above mentioned complaints. Pt stated after her first delivery in April 2022 she started having lower back pain which radiates to left leg. She was on Gabapentin but stopped taking the medication since last 2 weeks because of its side effects. Today her pain was worst and couldn't tolerate the pain.     Pt received 50 mcg of Fentanyl and 4 Mg Zofran IV by EMS.

## 2023-11-29 NOTE — PROGRESS NOTES
Pt is transported from ED with personal belongings. Pt is A&Ox4 and uses the call light.  Reached out to Provider Davian about pt still reporting severe pain with current PRN orders as well as anxious. See pt chart for provider orders. Please see pt chart for assessment and MAR. Report given to day shift RN.

## 2023-11-29 NOTE — HOSPITAL COURSE
Hoda Summers is a 23 y.o. female who presented 11/28/2023 with intractable back pain.  Patient states that she has had back pain since April 22 after the birth of her son.  However ever since this morning, she has had worsening back pain.  She states that she is unable to ambulate.  As result she presented to the emergency department.  She was given multiple doses of IV pain medication without any improvement.  MRI does show evidence of disc extrusion

## 2023-11-29 NOTE — PROGRESS NOTES
4 Eyes Skin Assessment Completed by ROWAN Yost and Stephen EMT-A    Head WDL  Ears WDL  Nose WDL  Mouth WDL  Neck WDL  Breast/Chest WDL  Shoulder Blades WDL  Spine WDL  (R) Arm/Elbow/Hand WDL  (L) Arm/Elbow/Hand WDL  Abdomen WDL  Groin WDL  Scrotum/Coccyx/Buttocks WDL  (R) Leg WDL  (L) Leg WDL  (R) Heel/Foot/Toe WDL  (L) Heel/Foot/Toe WDL          Devices In Places Blood Pressure Cuff and Pulse Ox      Interventions In Place Pillows    Possible Skin Injury No    Pictures Uploaded Into Epic N/A  Wound Consult Placed N/A  RN Wound Prevention Protocol Ordered No

## 2023-11-29 NOTE — ED PROVIDER NOTES
"ER Provider Note    Scribed for Dr. Sotero Vasquez M.D. by Deng Angel. 11/28/2023  8:27 PM    Primary Care Provider: No primary care provider on file.    CHIEF COMPLAINT  Chief Complaint   Patient presents with    Back Pain     Lower back pain radiating to left leg       EXTERNAL RECORDS REVIEWED  No notes found in our system however the patient did report having an epidural for her child back in summer 2022 and says that the pain started then.    HPI/ROS  LIMITATION TO HISTORY   Select: : None    OUTSIDE HISTORIAN(S):  None    Hoda Summers is a 23 y.o. female who presents to the ED for evaluation of lower back pain onset today. The patient reports associated symptoms of radiating pain to her left leg, but denies shooting pain. He reports her pain is exacerbated by bending her back, reporting sensations of tightness and worsened radiating pain. She currently describes her back pain as a 4/10 in intensity. Reports no symptom improvement with gabapentin and states it \"messes her head up.\" She reports she has a history of back pain she states have been manageable until today. She reports an epidural while giving birth at [facility] which she states started her back pain. Denies urinary incontinence. She denies any IV drug use. She notes her brother recently passed away due to an overdose. She reports she lives with her boyfriend.     PAST MEDICAL HISTORY  Patient reports chronic lower back pain since an epidural was placed during childbirth.    SURGICAL HISTORY  No past surgical history noted.    FAMILY HISTORY  No family history noted.    SOCIAL HISTORY   She reports she uses a vape.     CURRENT MEDICATIONS  Previous Medications    No medications on file     ALLERGIES  Patient has no allergy information on record.    PHYSICAL EXAM  /73   Pulse 75   Temp 35.8 °C (96.5 °F) (Temporal)   Resp 17   Ht 1.727 m (5' 8\")   Wt 90.7 kg (200 lb)   SpO2 92%   BMI 30.41 kg/m²   Constitutional: Alert in " no apparent distress.  HENT: No signs of trauma, Bilateral external ears normal, Nose normal.   Eyes: Pupils are equal and reactive, Conjunctiva normal, Non-icteric.   Neck: Normal range of motion, No tenderness, Supple, No stridor.   Lymphatic: No lymphadenopathy noted.   Cardiovascular: Regular rate and rhythm, no murmurs.   Thorax & Lungs: Normal breath sounds, No respiratory distress, No wheezing, No chest tenderness.   Abdomen: Bowel sounds normal, Soft, No tenderness, No masses, No pulsatile masses. No peritoneal signs.  Skin: Warm, Dry, No erythema, No rash.   Back: No bony tenderness, No CVA tenderness.   Extremities: Intact distal pulses, No edema, No tenderness, No cyanosis.  Musculoskeletal: Tenderness to palpation in the left paraspinal region. Good range of motion in all major joints.   Neurologic: Alert , Normal motor function, Normal sensory function, No focal deficits noted.   Psychiatric: Affect normal, Judgment normal, Mood normal.     COURSE & MEDICAL DECISION MAKING    ED Observation Status? Yes; I am placing the patient in to an observation status due to a diagnostic uncertainty as well as therapeutic intensity. Patient placed in observation status at 8:27 PM, 11/28/2023.     Observation plan is as follows: We will manage their symptoms, evaluate with diagnostic testing, and then reassess after results are reviewed      Upon Reevaluation, the patient's condition has: not improved; and will be escalated to hospitalization.    Patient discharged from ED Observation status at 9:40 PM  (Time) 11/28/2023  (Date).     INITIAL ASSESSMENT AND PLAN  Care Narrative:       8:27 PM - Patient seen and evaluated at bedside. Discussed plan of care, including medication for pain and reevaluation after medication administration. Patient agrees to plan of care. Patient will be treated with Zofran 4 mg injection, Morphine 4 mg injection, and Decadron 10 mg injection for her symptoms.    9:22 PM - Patient was  reevaluated at bedside.The patient reports pain is still present. Ordered for additional Morphine 4 mg injection.     9:34 PM - Patient was reevaluated at bedside. The patient reports her back feeling less stiff after morphine administration. Reexamination reveals tenderness to the left paraspinal region, but no midline spinal tenderness. Patient seen moving both lower extremities. Informed the patient of my plan for hospitalization given continuing back pain after 3 doses of opiates. Paged Hospitalist    ADDITIONAL PROBLEM LIST AND DISPOSITION  Patient with no red flags of back pain.  This is left-sided paraspinal tenderness already center leg.  This is likely sciatica.  She was given a dose of fentanyl in the ambulance.  I will redosed with pain medication and steroids and then reassess.  There are no red flags consistent imaging at this time.    After multiple doses of pain medication the patient continues to have significant pain.  Dexamethasone was also given.  At this time I believe this is likely musculoskeletal.  There is no weakness.  There is no need for neuroimaging at this time however we will need to see how the patient does as an inpatient with multiple doses of pain medication and reassessments.               DISPOSITION AND DISCUSSIONS  I have discussed management of the patient with the following physicians and MIKEY's: Dr. Almanza (Hospitalist).     Discussion of management with other [facility] or appropriate source(s): None       Barriers to care at this time, including but not limited to:  none .     Decision tools and prescription drugs considered including, but not limited to: Pain Medications given to the patient .    DISPOSITION:  Patient will be hospitalized by Dr. Almanza in guarded condition.     FINAL IMPRESSION   1. Lumbar back pain       I, Deng Angel (Aida), am scribing for, and in the presence of, Sotero Vasquez M.D..    Electronically signed by: Deng Talbot),  11/28/2023    ISotero M.D. personally performed the services described in this documentation, as scribed by Deng Angel in my presence, and it is both accurate and complete.    The note accurately reflects work and decisions made by me.  Sotero Vasquez M.D.  11/28/2023  10:02 PM

## 2023-11-29 NOTE — PROGRESS NOTES
[facility] Medicine Daily Progress Note    Date of Service  11/29/2023    Chief Complaint  Hoda Summers is a 23 y.o. female admitted 11/28/2023 with low back pain    Hospital Course  Hoda Summers is a 23 y.o. female who presented 11/28/2023 with intractable back pain.  Patient states that she has had back pain since April 22 after the birth of her son.  However ever since this morning, she has had worsening back pain.  She states that she is unable to ambulate.  As result she presented to the emergency department.  She was given multiple doses of IV pain medication without any improvement.  She denies any bladder or stool incontinence.  She does have some pain radiating down her left leg.  Patient will be admitted for management of intractable back pain.     Interval Problem Update  Lower back pain constant worse with any movement sharp pain, intensity 7 out of 10    I have ordered MRI of the lumbar spine    I have initiated multimodal pain managed including Motrin, Lyrica, Medrol Dosepak    Patient refuses gabapentin    I have discussed this patient's plan of care and discharge plan at IDT rounds today with Case Management, Nursing, Nursing leadership, and other members of the IDT team.    Consultants/Specialty      Code Status  Full Code    Disposition  The patient is not medically cleared for discharge to home or a post-acute facility.  Anticipate discharge to: home with close outpatient follow-up    I have placed the appropriate orders for post-discharge needs.    Review of Systems  Review of Systems   Constitutional:  Negative for chills, fever and weight loss.   HENT:  Negative for ear discharge, ear pain, hearing loss and tinnitus.    Eyes:  Negative for blurred vision, double vision and photophobia.   Respiratory:  Negative for cough and hemoptysis.    Cardiovascular:  Negative for chest pain, palpitations and orthopnea.   Gastrointestinal:  Negative for abdominal pain, constipation,  diarrhea, heartburn, nausea and vomiting.   Genitourinary:  Negative for dysuria, frequency and urgency.   Musculoskeletal:  Positive for back pain and myalgias.   Neurological:  Negative for dizziness, tingling, tremors, sensory change, speech change, focal weakness and headaches.   Psychiatric/Behavioral:  Negative for depression, hallucinations, substance abuse and suicidal ideas. The patient is not nervous/anxious.    All other systems reviewed and are negative.       Physical Exam  Temp:  [35.8 °C (96.5 °F)-36.6 °C (97.8 °F)] 36.2 °C (97.1 °F)  Pulse:  [65-84] 65  Resp:  [16-24] 16  BP: (106-131)/(59-75) 117/65  SpO2:  [90 %-99 %] 93 %    Physical Exam  Constitutional:       General: She is not in acute distress.     Appearance: She is not ill-appearing, toxic-appearing or diaphoretic.   HENT:      Head: Normocephalic and atraumatic.      Nose: Nose normal.      Mouth/Throat:      Mouth: Mucous membranes are dry.   Eyes:      General: No scleral icterus.        Right eye: No discharge.         Left eye: No discharge.      Extraocular Movements: Extraocular movements intact.      Pupils: Pupils are equal, round, and reactive to light.   Cardiovascular:      Rate and Rhythm: Regular rhythm. Tachycardia present.      Pulses: Normal pulses.      Heart sounds: No murmur heard.     No friction rub. No gallop.   Pulmonary:      Effort: No respiratory distress.      Breath sounds: No stridor. No wheezing, rhonchi or rales.   Abdominal:      General: Abdomen is flat. There is no distension.      Palpations: There is no mass.      Tenderness: There is no abdominal tenderness. There is no guarding or rebound.      Hernia: No hernia is present.   Musculoskeletal:         General: No swelling, tenderness, deformity or signs of injury. Normal range of motion.      Cervical back: Normal range of motion.   Skin:     Capillary Refill: Capillary refill takes 2 to 3 seconds.      Coloration: Skin is not jaundiced or pale.       Findings: No bruising, lesion or rash.   Neurological:      General: No focal deficit present.      Mental Status: She is alert and oriented to person, place, and time.      Cranial Nerves: No cranial nerve deficit.      Sensory: No sensory deficit.      Motor: No weakness.      Coordination: Coordination normal.   Psychiatric:         Mood and Affect: Mood normal.         Behavior: Behavior normal.         Fluids    Intake/Output Summary (Last 24 hours) at 11/29/2023 1045  Last data filed at 11/29/2023 1000  Gross per 24 hour   Intake 480 ml   Output --   Net 480 ml       Laboratory  Recent Labs     11/28/23  2248   WBC 8.8   RBC 5.64*   HEMOGLOBIN 16.3*   HEMATOCRIT 48.7*   MCV 86.3   MCH 28.9   MCHC 33.5   RDW 41.0   PLATELETCT 324   MPV 10.2     Recent Labs     11/28/23  2248   SODIUM 139   POTASSIUM 4.2   CHLORIDE 107   CO2 21   GLUCOSE 106*   BUN 4*   CREATININE 0.62   CALCIUM 9.5                   Imaging  DX-LUMBAR SPINE-2 OR 3 VIEWS   Final Result         1.  No acute traumatic bony injury of the lumbar spine.   2.  Probable facet arthrosis at L5/S1 resulting in neural foraminal stenosis.   3.  Mild anterior wedge deformity at T11, appearance favors chronic injury.      MR-LUMBAR SPINE-W/O    (Results Pending)        Assessment/Plan  * Intractable back pain- (present on admission)  Assessment & Plan  Patient with known history of back pain, who presents with 1 day of worsening back pain.  Patient states that the pain is located on the left lower back, radiates down her left leg.  Positive straight leg test.  No saddle signs.    Multimodal pain management -Motrin, Lyrica, Medrol Dosepak, Lidoderm patch, p.o. oxycodone and IV Dilaudid for severe pain    MRI of the lumbar spine    Obese- (present on admission)  Assessment & Plan  Talk to patient about weight loss strategies including diet and exercise.  She will need outpatient follow-up with primary care physician.         VTE prophylaxis:   SCDs/TEDs      I  have performed a physical exam and reviewed and updated ROS and Plan today (11/29/2023). In review of yesterday's note (11/28/2023), there are no changes except as documented above.

## 2023-11-30 PROCEDURE — 700101 HCHG RX REV CODE 250

## 2023-11-30 PROCEDURE — 302131 K PAD MOTOR: Performed by: HOSPITALIST

## 2023-11-30 PROCEDURE — 770004 HCHG ROOM/CARE - ONCOLOGY PRIVATE *

## 2023-11-30 PROCEDURE — 700102 HCHG RX REV CODE 250 W/ 637 OVERRIDE(OP)

## 2023-11-30 PROCEDURE — 700102 HCHG RX REV CODE 250 W/ 637 OVERRIDE(OP): Performed by: HOSPITALIST

## 2023-11-30 PROCEDURE — A9270 NON-COVERED ITEM OR SERVICE: HCPCS | Performed by: HOSPITALIST

## 2023-11-30 PROCEDURE — A9270 NON-COVERED ITEM OR SERVICE: HCPCS

## 2023-11-30 PROCEDURE — 700111 HCHG RX REV CODE 636 W/ 250 OVERRIDE (IP)

## 2023-11-30 PROCEDURE — 99233 SBSQ HOSP IP/OBS HIGH 50: CPT | Performed by: HOSPITALIST

## 2023-11-30 RX ORDER — CHOLECALCIFEROL (VITAMIN D3) 125 MCG
5 CAPSULE ORAL NIGHTLY PRN
Status: DISCONTINUED | OUTPATIENT
Start: 2023-11-30 | End: 2023-12-03 | Stop reason: HOSPADM

## 2023-11-30 RX ORDER — ONDANSETRON 2 MG/ML
4 INJECTION INTRAMUSCULAR; INTRAVENOUS EVERY 4 HOURS PRN
Status: DISCONTINUED | OUTPATIENT
Start: 2023-11-30 | End: 2023-12-03 | Stop reason: HOSPADM

## 2023-11-30 RX ADMIN — METHYLPREDNISOLONE 4 MG: 4 TABLET ORAL at 11:23

## 2023-11-30 RX ADMIN — OXYCODONE HYDROCHLORIDE 10 MG: 10 TABLET ORAL at 21:08

## 2023-11-30 RX ADMIN — METHYLPREDNISOLONE 4 MG: 4 TABLET ORAL at 08:21

## 2023-11-30 RX ADMIN — HYDROMORPHONE HYDROCHLORIDE 0.5 MG: 1 INJECTION, SOLUTION INTRAMUSCULAR; INTRAVENOUS; SUBCUTANEOUS at 09:35

## 2023-11-30 RX ADMIN — PREGABALIN 25 MG: 25 CAPSULE ORAL at 05:35

## 2023-11-30 RX ADMIN — OXYCODONE HYDROCHLORIDE 10 MG: 10 TABLET ORAL at 11:23

## 2023-11-30 RX ADMIN — METHYLPREDNISOLONE 8 MG: 4 TABLET ORAL at 21:10

## 2023-11-30 RX ADMIN — Medication 5 MG: at 21:11

## 2023-11-30 RX ADMIN — IBUPROFEN 600 MG: 400 TABLET, FILM COATED ORAL at 14:31

## 2023-11-30 RX ADMIN — PREGABALIN 25 MG: 25 CAPSULE ORAL at 17:47

## 2023-11-30 RX ADMIN — OXYCODONE HYDROCHLORIDE 10 MG: 10 TABLET ORAL at 01:33

## 2023-11-30 RX ADMIN — LIDOCAINE 2 PATCH: 4 PATCH TOPICAL at 05:33

## 2023-11-30 RX ADMIN — HYDROMORPHONE HYDROCHLORIDE 0.5 MG: 1 INJECTION, SOLUTION INTRAMUSCULAR; INTRAVENOUS; SUBCUTANEOUS at 05:40

## 2023-11-30 RX ADMIN — OXYCODONE HYDROCHLORIDE 10 MG: 10 TABLET ORAL at 08:21

## 2023-11-30 RX ADMIN — PREGABALIN 25 MG: 25 CAPSULE ORAL at 11:23

## 2023-11-30 RX ADMIN — OXYCODONE HYDROCHLORIDE 10 MG: 10 TABLET ORAL at 17:47

## 2023-11-30 RX ADMIN — OXYCODONE HYDROCHLORIDE 10 MG: 10 TABLET ORAL at 14:31

## 2023-11-30 RX ADMIN — METHYLPREDNISOLONE 4 MG: 4 TABLET ORAL at 17:47

## 2023-11-30 RX ADMIN — IBUPROFEN 600 MG: 400 TABLET, FILM COATED ORAL at 21:09

## 2023-11-30 RX ADMIN — HYDROMORPHONE HYDROCHLORIDE 0.5 MG: 1 INJECTION, SOLUTION INTRAMUSCULAR; INTRAVENOUS; SUBCUTANEOUS at 22:32

## 2023-11-30 ASSESSMENT — ENCOUNTER SYMPTOMS
NAUSEA: 0
PALPITATIONS: 0
CHILLS: 0
SPUTUM PRODUCTION: 0
ORTHOPNEA: 0
DIZZINESS: 0
BACK PAIN: 1
VOMITING: 0
COUGH: 0
HEMOPTYSIS: 0

## 2023-11-30 ASSESSMENT — PAIN DESCRIPTION - PAIN TYPE
TYPE: ACUTE PAIN

## 2023-11-30 NOTE — DIETARY
"Nutrition services: Day 0 of admit.  Hoda Summers is a 23 y.o. female with admitting DX of back pain.     Pt seen for poor PO/wt loss per admit screen, malnutrition screening tool score of 4. RD met with pt who reports only eating bites to nothing over past week related to worsening back pain, pt unsure of UBW/wt loss. Pt reports poor appetite continues while in hospital, pain has not improved and dislikes hospital food. Discussed food options and supplements drinks, pt provided preferences and agreeable to try supplement drinks.       Assessment:  Height: 175.3 cm (5' 9\")  Weight: 123 kg (270 lb 1 oz)  Body mass index is 39.88 kg/m²., BMI classification: Class II obesity.   Diet/Intake: Regular. No PO intake recorded yet.     Evaluation:   Labs/meds reviewed.  No previous wts to assess.   Nutrition Focused Physical Exam: RD did not feel exam appropriate as pt having body pain and appeared well nourished; also pt would be difficult to assess due to pt's habitus.     Malnutrition Risk: limited information on wt loss, unable to meet ASPEN Criteria at this time. Pt high risk for acute disease malnutrition related to minimal intake for past week per pt report.     Recommendations/Plan:  Will add Ensure Plus with all meals, pt prefers chocolate and Berry flavors.   Added standards to meals to help optimize intake and better meet pt preferences: eggs/duran/pancakes for breakfast, sandwich for lunch and mashed potatoes with gravy and chicken for dinner.   Encourage intake and document all meals/snacks consumed as % taken in ADL's to provide interdisciplinary communication across all shifts.   Monitor weight.  Nutrition rep will continue to see patient for ongoing meal and snack preferences.     RD following.           "

## 2023-11-30 NOTE — CARE PLAN
Problem: Pain - Standard  Goal: Alleviation of pain or a reduction in pain to the patient’s comfort goal  Outcome: Progressing   The patient is Stable - Low risk of patient condition declining or worsening    Shift Goals  Clinical Goals: Back to  remain control and MRI to be done  Patient Goals: Comfort  Family Goals: IVAN    Progress made toward(s) clinical / shift goals:  yes back ain control better wit Oxy 10 mg and Pt got her MRI done.    Patient is not progressing towards the following goals:

## 2023-11-30 NOTE — PROGRESS NOTES
4 Eyes Skin Assessment Completed by ROWAN Graham and ROWAN Rojas.    Head WDL  Ears WDL  Nose WDL  Mouth WDL  Neck WDL  Breast/Chest WDL  Shoulder Blades WDL  Spine WDL  (R) Arm/Elbow/Hand WDL  (L) Arm/Elbow/Hand WDL  Abdomen WDL  Groin Incision  Scrotum/Coccyx/Buttocks WDL  (R) Leg WDL  (L) Leg WDL  (R) Heel/Foot/Toe WDL  (L) Heel/Foot/Toe WDL          Devices In Places Pulse Ox      Interventions In Place Pillows    Possible Skin Injury No    Pictures Uploaded Into Epic N/A  Wound Consult Placed N/A  RN Wound Prevention Protocol Ordered No

## 2023-11-30 NOTE — PROGRESS NOTES
Hospital Medicine Daily Progress Note    Date of Service  11/30/2023    Chief Complaint  Hoda Summers is a 23 y.o. female admitted 11/28/2023 with back pain    Hospital Course  Hoda Summers is a 23 y.o. female who presented 11/28/2023 with intractable back pain.  Patient states that she has had back pain since April 22 after the birth of her son.  However ever since this morning, she has had worsening back pain.  She states that she is unable to ambulate.  As result she presented to the emergency department.  She was given multiple doses of IV pain medication without any improvement.  MRI does show evidence of disc extrusion    Interval Problem Update  Continues to have severe pain, she is not able even to lie flat on the hospital bed, she is having muscle spasms and pain radiating down her left leg, some relief with IV Dilaudid.  No bowel or bladder incontinence    52 minutes of aggregate pateint care time including review of the medical records, patient interview and examination, review of MRI, consultation of neurosurgery, additional discussion with the patient discussion with RN and case management.    I have discussed this patient's plan of care and discharge plan at IDT rounds today with Case Management, Nursing, Nursing leadership, and other members of the IDT team.    Consultants/Specialty  Spine surgery    Code Status  Full Code    Disposition  The patient is not medically cleared for discharge to home or a post-acute facility.  Anticipate discharge to: home with close outpatient follow-up    I have placed the appropriate orders for post-discharge needs.    Review of Systems  Review of Systems   Constitutional:  Negative for chills and malaise/fatigue.   Respiratory:  Negative for cough, hemoptysis and sputum production.    Cardiovascular:  Negative for chest pain, palpitations and orthopnea.   Gastrointestinal:  Negative for nausea and vomiting.   Musculoskeletal:  Positive for back pain.    Skin:  Negative for itching and rash.   Neurological:  Negative for dizziness.   All other systems reviewed and are negative.       Physical Exam  Temp:  [36.4 °C (97.5 °F)-36.9 °C (98.5 °F)] 36.9 °C (98.5 °F)  Pulse:  [63-81] 81  Resp:  [16] 16  BP: (102-137)/(59-85) 118/81  SpO2:  [91 %-93 %] 91 %    Physical Exam  Constitutional:       General: She is in acute distress.      Appearance: Normal appearance. She is normal weight.   HENT:      Head: Normocephalic and atraumatic.      Right Ear: External ear normal.      Left Ear: External ear normal.      Nose: Nose normal.      Mouth/Throat:      Mouth: Mucous membranes are moist.      Pharynx: Oropharynx is clear.   Eyes:      Extraocular Movements: Extraocular movements intact.      Conjunctiva/sclera: Conjunctivae normal.      Pupils: Pupils are equal, round, and reactive to light.   Cardiovascular:      Rate and Rhythm: Normal rate and regular rhythm.      Pulses: Normal pulses.   Pulmonary:      Effort: Pulmonary effort is normal.      Breath sounds: Normal breath sounds.   Abdominal:      General: Abdomen is flat. Bowel sounds are normal.      Palpations: Abdomen is soft.   Musculoskeletal:         General: Normal range of motion.      Cervical back: Normal range of motion and neck supple.   Skin:     General: Skin is warm and dry.      Capillary Refill: Capillary refill takes less than 2 seconds.      Coloration: Skin is not jaundiced.   Neurological:      General: No focal deficit present.      Mental Status: She is alert and oriented to person, place, and time.      Cranial Nerves: No cranial nerve deficit.      Gait: Gait normal.      Comments: Patient is able to move both lower extremities, she is unable to relax enough for reflex testing, sensation is intact, unable to stand or walk due to the pain   Psychiatric:         Mood and Affect: Mood normal.         Behavior: Behavior normal.         Fluids  No intake or output data in the 24 hours ending  "11/30/23 1250    Laboratory  Recent Labs     11/28/23  2248   WBC 8.8   RBC 5.64*   HEMOGLOBIN 16.3*   HEMATOCRIT 48.7*   MCV 86.3   MCH 28.9   MCHC 33.5   RDW 41.0   PLATELETCT 324   MPV 10.2     Recent Labs     11/28/23  2248   SODIUM 139   POTASSIUM 4.2   CHLORIDE 107   CO2 21   GLUCOSE 106*   BUN 4*   CREATININE 0.62   CALCIUM 9.5                   Imaging  MR-LUMBAR SPINE-W/O   Final Result         Left paracentral disc extrusion at L4-5 that migrates slightly caudally and impinges upon the descending left L5 nerve.      Right paracentral disc protrusion at L5-S1 that impinges upon the right S1 nerve in the lateral recess.         DX-LUMBAR SPINE-2 OR 3 VIEWS   Final Result         1.  No acute traumatic bony injury of the lumbar spine.   2.  Probable facet arthrosis at L5/S1 resulting in neural foraminal stenosis.   3.  Mild anterior wedge deformity at T11, appearance favors chronic injury.           Assessment/Plan  * Intractable back pain- (present on admission)  Assessment & Plan  11/30:  Acute on chronic pain  Her pain is intractable, requiring IV dilaudid, continue for now, monitor for side effects which can include respiratory depression  MRI of the sine 11/29/2023, results reviewed:  \"Left paracentral disc extrusion at L4-5 that migrates slightly caudally and impinges upon the descending left L5 nerve.  Right paracentral disc protrusion at L5-S1 that impinges upon the right S1 nerve in the lateral recess.\"    I consulted Dr. Gimenez of neurosurgery who will see the patient in consultation  Hold Lovenox    Obese- (present on admission)  Assessment & Plan  Talk to patient about weight loss strategies including diet and exercise.  She will need outpatient follow-up with primary care physician.         VTE prophylaxis:   SCDs/TEDs      I have performed a physical exam and reviewed and updated ROS and Plan today (11/30/2023). In review of yesterday's note (11/29/2023), there are no changes except as " documented above.

## 2023-12-01 ENCOUNTER — ANESTHESIA EVENT (OUTPATIENT)
Dept: SURGERY | Facility: MEDICAL CENTER | Age: 23
DRG: 519 | End: 2023-12-01
Payer: MEDICAID

## 2023-12-01 ENCOUNTER — ANESTHESIA (OUTPATIENT)
Dept: SURGERY | Facility: MEDICAL CENTER | Age: 23
DRG: 519 | End: 2023-12-01
Payer: MEDICAID

## 2023-12-01 PROBLEM — E66.01 SEVERE OBESITY (HCC): Status: ACTIVE | Noted: 2023-11-28

## 2023-12-01 PROBLEM — M51.36 BULGING OF INTERVERTEBRAL DISC BETWEEN L4 AND L5: Status: ACTIVE | Noted: 2023-12-01

## 2023-12-01 PROBLEM — M51.369 BULGING OF INTERVERTEBRAL DISC BETWEEN L4 AND L5: Status: ACTIVE | Noted: 2023-12-01

## 2023-12-01 PROCEDURE — 770004 HCHG ROOM/CARE - ONCOLOGY PRIVATE *

## 2023-12-01 PROCEDURE — A9270 NON-COVERED ITEM OR SERVICE: HCPCS | Performed by: HOSPITALIST

## 2023-12-01 PROCEDURE — 99232 SBSQ HOSP IP/OBS MODERATE 35: CPT | Performed by: STUDENT IN AN ORGANIZED HEALTH CARE EDUCATION/TRAINING PROGRAM

## 2023-12-01 PROCEDURE — A9270 NON-COVERED ITEM OR SERVICE: HCPCS

## 2023-12-01 PROCEDURE — A9270 NON-COVERED ITEM OR SERVICE: HCPCS | Performed by: STUDENT IN AN ORGANIZED HEALTH CARE EDUCATION/TRAINING PROGRAM

## 2023-12-01 PROCEDURE — 700101 HCHG RX REV CODE 250

## 2023-12-01 PROCEDURE — 700102 HCHG RX REV CODE 250 W/ 637 OVERRIDE(OP): Performed by: STUDENT IN AN ORGANIZED HEALTH CARE EDUCATION/TRAINING PROGRAM

## 2023-12-01 PROCEDURE — 700102 HCHG RX REV CODE 250 W/ 637 OVERRIDE(OP)

## 2023-12-01 PROCEDURE — 700102 HCHG RX REV CODE 250 W/ 637 OVERRIDE(OP): Performed by: HOSPITALIST

## 2023-12-01 RX ORDER — DEXAMETHASONE 4 MG/1
4 TABLET ORAL 2 TIMES DAILY
Status: DISCONTINUED | OUTPATIENT
Start: 2023-12-01 | End: 2023-12-03 | Stop reason: HOSPADM

## 2023-12-01 RX ORDER — OMEPRAZOLE 20 MG/1
20 CAPSULE, DELAYED RELEASE ORAL DAILY
Status: DISCONTINUED | OUTPATIENT
Start: 2023-12-01 | End: 2023-12-03 | Stop reason: HOSPADM

## 2023-12-01 RX ORDER — CYCLOBENZAPRINE HCL 10 MG
10 TABLET ORAL 3 TIMES DAILY
Status: DISCONTINUED | OUTPATIENT
Start: 2023-12-01 | End: 2023-12-03 | Stop reason: HOSPADM

## 2023-12-01 RX ORDER — DEXAMETHASONE 4 MG/1
4 TABLET ORAL 2 TIMES DAILY
Status: DISCONTINUED | OUTPATIENT
Start: 2023-12-01 | End: 2023-12-01

## 2023-12-01 RX ORDER — ACETAMINOPHEN 500 MG
1000 TABLET ORAL 3 TIMES DAILY
Status: DISCONTINUED | OUTPATIENT
Start: 2023-12-01 | End: 2023-12-03 | Stop reason: HOSPADM

## 2023-12-01 RX ADMIN — IBUPROFEN 600 MG: 400 TABLET, FILM COATED ORAL at 14:42

## 2023-12-01 RX ADMIN — METHYLPREDNISOLONE 4 MG: 4 TABLET ORAL at 11:33

## 2023-12-01 RX ADMIN — OMEPRAZOLE 20 MG: 20 CAPSULE, DELAYED RELEASE ORAL at 17:12

## 2023-12-01 RX ADMIN — ACETAMINOPHEN 1000 MG: 500 TABLET ORAL at 17:11

## 2023-12-01 RX ADMIN — PREGABALIN 25 MG: 25 CAPSULE ORAL at 05:46

## 2023-12-01 RX ADMIN — ACETAMINOPHEN 1000 MG: 500 TABLET ORAL at 21:14

## 2023-12-01 RX ADMIN — OXYCODONE HYDROCHLORIDE 10 MG: 10 TABLET ORAL at 07:39

## 2023-12-01 RX ADMIN — OXYCODONE HYDROCHLORIDE 10 MG: 10 TABLET ORAL at 03:04

## 2023-12-01 RX ADMIN — DEXAMETHASONE 4 MG: 4 TABLET ORAL at 17:12

## 2023-12-01 RX ADMIN — METHYLPREDNISOLONE 4 MG: 4 TABLET ORAL at 07:42

## 2023-12-01 RX ADMIN — IBUPROFEN 600 MG: 400 TABLET, FILM COATED ORAL at 21:14

## 2023-12-01 RX ADMIN — OXYCODONE HYDROCHLORIDE 10 MG: 10 TABLET ORAL at 14:42

## 2023-12-01 RX ADMIN — Medication 5 MG: at 21:51

## 2023-12-01 RX ADMIN — PREGABALIN 25 MG: 25 CAPSULE ORAL at 11:33

## 2023-12-01 RX ADMIN — PREGABALIN 25 MG: 25 CAPSULE ORAL at 17:12

## 2023-12-01 RX ADMIN — DOCUSATE SODIUM 50 MG AND SENNOSIDES 8.6 MG 2 TABLET: 8.6; 5 TABLET, FILM COATED ORAL at 17:12

## 2023-12-01 RX ADMIN — IBUPROFEN 600 MG: 400 TABLET, FILM COATED ORAL at 08:58

## 2023-12-01 RX ADMIN — CYCLOBENZAPRINE 10 MG: 10 TABLET, FILM COATED ORAL at 14:10

## 2023-12-01 RX ADMIN — CYCLOBENZAPRINE 10 MG: 10 TABLET, FILM COATED ORAL at 21:13

## 2023-12-01 RX ADMIN — LIDOCAINE 2 PATCH: 4 PATCH TOPICAL at 05:46

## 2023-12-01 RX ADMIN — OXYCODONE HYDROCHLORIDE 10 MG: 10 TABLET ORAL at 18:15

## 2023-12-01 RX ADMIN — OXYCODONE HYDROCHLORIDE 10 MG: 10 TABLET ORAL at 21:51

## 2023-12-01 RX ADMIN — OXYCODONE HYDROCHLORIDE 10 MG: 10 TABLET ORAL at 11:33

## 2023-12-01 ASSESSMENT — COGNITIVE AND FUNCTIONAL STATUS - GENERAL
DAILY ACTIVITIY SCORE: 21
SUGGESTED CMS G CODE MODIFIER DAILY ACTIVITY: CJ
MOVING TO AND FROM BED TO CHAIR: A LOT
CLIMB 3 TO 5 STEPS WITH RAILING: A LOT
STANDING UP FROM CHAIR USING ARMS: A LOT
MOBILITY SCORE: 15
SUGGESTED CMS G CODE MODIFIER MOBILITY: CK
WALKING IN HOSPITAL ROOM: A LITTLE
MOVING FROM LYING ON BACK TO SITTING ON SIDE OF FLAT BED: A LOT
DRESSING REGULAR LOWER BODY CLOTHING: A LITTLE
TOILETING: A LITTLE
HELP NEEDED FOR BATHING: A LITTLE

## 2023-12-01 ASSESSMENT — ENCOUNTER SYMPTOMS
NERVOUS/ANXIOUS: 1
NAUSEA: 0
SENSORY CHANGE: 1
PALPITATIONS: 0
COUGH: 0
VOMITING: 0
SPUTUM PRODUCTION: 0
NECK PAIN: 0
DEPRESSION: 0
BACK PAIN: 1
ABDOMINAL PAIN: 0
CONSTIPATION: 0
HEMOPTYSIS: 0
ORTHOPNEA: 0
BLOOD IN STOOL: 0
DIARRHEA: 0
MYALGIAS: 0

## 2023-12-01 ASSESSMENT — PAIN DESCRIPTION - PAIN TYPE
TYPE: ACUTE PAIN

## 2023-12-01 NOTE — PROGRESS NOTES
[facility] Medicine Daily Progress Note    Date of Service  12/1/2023    Chief Complaint  Hoda Summers is a 23 y.o. female admitted 11/28/2023 with back pain    Hospital Course  Hoda Summers is a 23 y.o. female who presented 11/28/2023 with intractable back pain.  Patient states that she has had back pain since April 22 after the birth of her son.  However ever since this morning, she has had worsening back pain.  She states that she is unable to ambulate.  As result she presented to the emergency department.  She was given multiple doses of IV pain medication without any improvement.  MRI does show evidence of disc extrusion    Interval Problem Update    ANH ON  Pain is controlled with every 3-4 hours of oxycodone.  Has not required IV dilaudid today.  She ambulated to the bathroom with severe pain today.  Pain radiates down her Left leg with intermittent foot numbness.  Pain is sometimes spasms, sometimes sharp.  Switched from medrol to dexamethasone.  Scheduled ibuprofen and flexeril.  Scheduled PPI for PUD prophylaxis.  POC discussed with spinal surgeon Dr. Gimenez. NPO for surgery tomorrow, will discuss full risks/benefits/approach with her tomorrow morning.    I have discussed this patient's plan of care and discharge plan at IDT rounds today with Case Management, Nursing, Nursing leadership, and other members of the IDT team.    Consultants/Specialty  Ortho Spine surgery    Code Status  Full Code    Disposition  The patient is not medically cleared for discharge to home or a post-acute facility.  Anticipate discharge to: home with close outpatient follow-up    I have placed the appropriate orders for post-discharge needs.    Review of Systems  Review of Systems   Respiratory:  Negative for cough, hemoptysis and sputum production.    Cardiovascular:  Negative for chest pain, palpitations and orthopnea.   Gastrointestinal:  Negative for abdominal pain, blood in stool, constipation, diarrhea,  melena, nausea and vomiting.   Musculoskeletal:  Positive for back pain. Negative for joint pain, myalgias and neck pain.   Skin:  Negative for itching and rash.   Neurological:  Positive for sensory change.   Psychiatric/Behavioral:  Negative for depression. The patient is nervous/anxious.    All other systems reviewed and are negative.       Physical Exam  Temp:  [35.8 °C (96.5 °F)-36.7 °C (98 °F)] 36.7 °C (98 °F)  Pulse:  [57-65] 62  Resp:  [15-18] 16  BP: ()/(57-73) 121/73  SpO2:  [94 %-98 %] 95 %    Physical Exam  Vitals and nursing note reviewed.   Constitutional:       General: She is not in acute distress.     Appearance: She is obese. She is not ill-appearing, toxic-appearing or diaphoretic.   HENT:      Head: Normocephalic and atraumatic.      Nose: Nose normal.      Mouth/Throat:      Mouth: Mucous membranes are dry.   Eyes:      General: No scleral icterus.     Conjunctiva/sclera: Conjunctivae normal.   Cardiovascular:      Rate and Rhythm: Normal rate and regular rhythm.      Pulses: Normal pulses.      Heart sounds: Normal heart sounds. No murmur heard.     No friction rub. No gallop.   Pulmonary:      Effort: Pulmonary effort is normal. No respiratory distress.      Breath sounds: Normal breath sounds. No wheezing, rhonchi or rales.   Abdominal:      General: Abdomen is protuberant. Bowel sounds are normal. There is no distension.      Palpations: Abdomen is soft.      Tenderness: There is no abdominal tenderness. There is no guarding or rebound.   Genitourinary:     Comments: No monahan  Musculoskeletal:      Cervical back: Neck supple.      Lumbar back: No edema, spasms, tenderness or bony tenderness. Positive left straight leg raise test. Negative right straight leg raise test.      Right lower leg: No edema.      Left lower leg: No edema.   Skin:     General: Skin is warm and dry.      Capillary Refill: Capillary refill takes less than 2 seconds.   Neurological:      Mental Status: She is  alert.      Motor: Weakness (4/5 LLE dorsiflexion, 5/5 RLE) present.      Comments: Appropriately conversant   Psychiatric:         Attention and Perception: Attention and perception normal.         Mood and Affect: Affect normal. Mood is anxious.         Speech: Speech normal.         Behavior: Behavior normal. Behavior is cooperative.         Thought Content: Thought content normal.         Cognition and Memory: Cognition and memory normal.         Judgment: Judgment normal.         Fluids  No intake or output data in the 24 hours ending 12/01/23 1556    Laboratory  Recent Labs     11/28/23  2248   WBC 8.8   RBC 5.64*   HEMOGLOBIN 16.3*   HEMATOCRIT 48.7*   MCV 86.3   MCH 28.9   MCHC 33.5   RDW 41.0   PLATELETCT 324   MPV 10.2       Recent Labs     11/28/23  2248   SODIUM 139   POTASSIUM 4.2   CHLORIDE 107   CO2 21   GLUCOSE 106*   BUN 4*   CREATININE 0.62   CALCIUM 9.5                     Imaging  MR-LUMBAR SPINE-W/O   Final Result         Left paracentral disc extrusion at L4-5 that migrates slightly caudally and impinges upon the descending left L5 nerve.      Right paracentral disc protrusion at L5-S1 that impinges upon the right S1 nerve in the lateral recess.         DX-LUMBAR SPINE-2 OR 3 VIEWS   Final Result         1.  No acute traumatic bony injury of the lumbar spine.   2.  Probable facet arthrosis at L5/S1 resulting in neural foraminal stenosis.   3.  Mild anterior wedge deformity at T11, appearance favors chronic injury.           Assessment/Plan  * Intractable back pain- (present on admission)  Assessment & Plan  Multimodal pain management  NSAID: Scheduled ibuprofen 600 mg TID  Steroid: Dexamethasone 4 mg BID x7 days  APAP: Scheduled 1000 mg TID  Anti-neuropathic: Lyrica 25 mg TID (intolerant of gabapentin)  Anti-spasmodic: Flexeril 10 mg TID  Topical: Lidoderm patch q24h  Temperature: Warm pack  Opiate PRN: Oxycodone 5-10 mg q3h PRN + IV Hydromorphone PRN for breakthrough  PT/OT Eval and  Treat    Bulging of intervertebral disc between L4 and L5- (present on admission)  Assessment & Plan  MRI demonstrates L4-L5 and L5-S1 extrusions with L4-L5 compressing Left descending nerve  Spinal surgery consulted, plan for L4-L5 discectomy with microdissection by Dr. Gimenez 12/2  NPO at midnight, no pharmacologic PPX pre-operative    Severe obesity (HCC)- (present on admission)  Assessment & Plan  Suspected OHS/ASTON based on secondary polycythemia  Outpatient weight loss      VTE prophylaxis:   SCDs/TEDs   pharmacologic prophylaxis contraindicated due to spinal surgery tomorrow      I have performed a physical exam and reviewed and updated ROS and Plan today (12/1/2023). In review of yesterday's note (11/30/2023), there are no changes except as documented above.

## 2023-12-01 NOTE — CARE PLAN
The patient is Watcher - Medium risk of patient condition declining or worsening    Shift Goals  Clinical Goals: pain control  Patient Goals: pain control, surgery consult  Family Goals: IVAN    Progress made toward(s) clinical / shift goals:  Patient is AxO x4 and understands plan of care, all questions answered at this time. Call light and personal belongings are within reach. Pt calls appropriately for nursing needs. Frequent rounding in place. Bed is locked and in lowest position. PRN pain medication administered per MAR.    Patient is not progressing towards the following goals: NA

## 2023-12-01 NOTE — CONSULTS
"ORTHO SPINE CONSULT  [facility] [place]          Chief Complaint:     Intractable low back pain with radiation down left lateral lower extremity    History of Present Illness:    Ms. Summers is a 23-year-old female with past medical history significant for childbirth back in April and asthma.    This patient presents to [facility] emergency room with complaints of intractable low back pain with radiation down the lateral aspect of the left lower extremity.    Patient states that the symptoms started back in April of this year.    She attributes the initiation of these symptoms directly to having been pregnant and childbirth.    [facility].  She was provided with gabapentin.  She took this for approximately 2 weeks.  However she was unable to tolerate this medication.  She states it \"messed with my head \".    Patient states that she is having a difficult time walking.  She is having a difficult time getting around.    She lives here in [place] with her fiancé and her child.    The patient works as a  on [employer].    Patient denies any bowel or bladder difficulties.  She does complain of numbness and tingling down the posterior aspect of the left lower extremity.    She denies any pain in the right lower extremity.    Past medical history:    1.  Asthma    Past surgical history:        Negative    Medications:         None    Allergies:      No known drug allergies    Social history:                   Patient lives with her boyfriend and her child.    Patient vapes.  She denies illicit drug use.  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    Family history:        Negative    Review of systems:         Negative    Physical exam:    General: Patient is a pleasant 23-year-old female who appears stated age.  She is laying in her hospital bed.  I walked into the room she was " playing on her iPhone.    She appeared to be in no distress.  She appeared to be calm.    Mental status: Patient is alert and oriented x 4.    Lungs: Bilateral chest expansion.  She has no active cough.  She is not labored in her breathing.  Respiratory rate appears normal.    Abdomen: Soft nontender nondistended.    Extremities:    Patient demonstrates 5 out of 5 muscle strength in ankle dorsiflexion ankle plantarflexion knee extension bilaterally.  She does require significant encouragement to comply with the physical exam.  She appears to want to give way to resistance.  However if encouraged she can sustain 5 out of 5.  She may have a little bit of weakness in extensor houses longus on the left compared to the right.    She does complain of pain with raising her straight leg off of the bed on the left.  However this does not appear to be exactly positive for straight leg raise test.    She has no pain with internal rotation or external rotation of the lower extremities at the groin.    Calves are soft nontender.    Lumbar spine is soft and supple.    X-ray/imagin.  MRI lumbar spine 2023:    There is a large left-sided L4-L5 herniated nucleus pulposus which causes significant left-sided lateral recess stenosis at this level.  There is impingement of the traversing L5 nerve root.    There is degenerative changes present at L5-S1 as well.  There is a disc protrusion on the right resulting in some lateral recess stenosis on the right at L5-S1.    There is no signs of gross instability.  No signs of central canal stenosis.  No significant foraminal stenosis.    Disc base height seems to be maintained.    No bony lesions.  No acute finding.    Assessment:    1.  Intractable low back pain with radiation down the lateral aspect of the left lower extremity  2.  Intractable left lower extremity pain consistent with L5 radiculopathy  3.  Left-sided L4-L5 herniated nucleus pulposus with significant lateral  recess stenosis  4.  Obesity  5.  Lumbar multilevel degenerative disc disease    Plan:    The patient was made aware of her MRI findings.  We discussed different treatment options.    The patient's lack of mobility seems out of proportion to the MRI findings.  However it may be consistent with her age.    The patient has started oral steroids since her admission to [facility].   It does not appear that she has been fully treated from a conservative standpoint.    Today we did discuss an epidural steroid injection.  Specifically a left L5 transforaminal epidural steroid injection.  This would likely be performed as an outpatient.    We did discuss the surgical option.  I am concerned with the possibility of surgery.  Her response to this disc herniation seems to be out of proportion to what is typical.  Therefore her response to surgery may be less predictable.    She is obviously at a higher risk of complication due to her obesity and a body habitus.  However this could be mitigated.    We did discuss the risk benefits and alternatives associate with both nonoperative treatment as well as operative treatment.    She appears to be leaning towards a surgical solution.    I plan to look at her medications the she is currently taking.  She should be seen by physical therapy and Occupational Therapy.  I will telephone the operating room and determine availability.  Further recommendations will follow.    Rocco Gimenez MD

## 2023-12-01 NOTE — PROGRESS NOTES
ORTHO SPINE Progress Note  [facility] [place]        Date of Service: 2023    Chief Complaint  23 y.o. female admitted 2023 with intractable low back pain with intractable pain down the lateral aspect of the left lower extremity.    This patient has failed to progress with physical therapy.  She is unable to mobilize.    The patient has been dealing with the symptoms since April.    The patient will be taken to the operating room tomorrow for a left-sided L4 laminotomy with left-sided L4-L5 microdiscectomy.    Interval Problem Update  Unchanged    Consultants/Specialty  Hospitalist    Disposition  Good        ROS   Physical Exam  Laboratory/Imaging   Hemodynamics  Temp (24hrs), Av.2 °C (97.2 °F), Min:35.8 °C (96.5 °F), Max:36.5 °C (97.7 °F)   Temperature: 36.5 °C (97.7 °F)  Pulse  Av.2  Min: 52  Max: 84    Blood Pressure: 129/72      Respiratory      Respiration: 15, Pulse Oximetry: 94 %        RUL Breath Sounds: Clear, RML Breath Sounds: Clear, RLL Breath Sounds: Clear, RUSS Breath Sounds: Clear, LLL Breath Sounds: Clear    Fluids  No intake or output data in the 24 hours ending 23 1503    Nutrition  Orders Placed This Encounter   Procedures    Diet Order Diet: Regular     Standing Status:   Standing     Number of Occurrences:   1     Order Specific Question:   Diet:     Answer:   Regular [1]    Diet NPO Restrict to: Strict     Standing Status:   Standing     Number of Occurrences:   8     Order Specific Question:   Diet NPO Restrict to:     Answer:   Strict [1]     Physical Exam    Recent Labs     23  2248   WBC 8.8   RBC 5.64*   HEMOGLOBIN 16.3*   HEMATOCRIT 48.7*   MCV 86.3   MCH 28.9   MCHC 33.5   RDW 41.0   PLATELETCT 324   MPV 10.2     Recent Labs     23  2248   SODIUM 139   POTASSIUM 4.2   CHLORIDE 107   CO2 21   GLUCOSE 106*   BUN 4*   CREATININE 0.62   CALCIUM 9.5                      Assessment/Plan     No new Assessment & Plan notes have been filed under  this hospital service since the last note was generated.  Service: Surgery Orthopedic      1.  Left L4-L5 herniated nucleus pulposus  2.  Left L4-L5 lateral recess stenosis  3.  Intractable low back pain  4.  Intractable left lower extremity radiculopathy consistent with L5  5.  Obesity    Plan:    The risk, the benefits the alternatives of operative treatment were discussed at length with the patient.    The patient will be taken to the operating room tomorrow for a left-sided L4-L5 microdiscectomy.    All questions were answered.    N.p.o. tonight at midnight      Quality-Core Measures

## 2023-12-02 ENCOUNTER — APPOINTMENT (OUTPATIENT)
Dept: RADIOLOGY | Facility: MEDICAL CENTER | Age: 23
DRG: 519 | End: 2023-12-02
Attending: ORTHOPAEDIC SURGERY
Payer: MEDICAID

## 2023-12-02 LAB — HCG SERPL QL: NEGATIVE

## 2023-12-02 PROCEDURE — 160002 HCHG RECOVERY MINUTES (STAT): Performed by: ORTHOPAEDIC SURGERY

## 2023-12-02 PROCEDURE — A9270 NON-COVERED ITEM OR SERVICE: HCPCS

## 2023-12-02 PROCEDURE — 700101 HCHG RX REV CODE 250: Performed by: ORTHOPAEDIC SURGERY

## 2023-12-02 PROCEDURE — 700111 HCHG RX REV CODE 636 W/ 250 OVERRIDE (IP): Performed by: ANESTHESIOLOGY

## 2023-12-02 PROCEDURE — 0SB20ZZ EXCISION OF LUMBAR VERTEBRAL DISC, OPEN APPROACH: ICD-10-PCS | Performed by: ORTHOPAEDIC SURGERY

## 2023-12-02 PROCEDURE — 160009 HCHG ANES TIME/MIN: Performed by: ORTHOPAEDIC SURGERY

## 2023-12-02 PROCEDURE — 700101 HCHG RX REV CODE 250

## 2023-12-02 PROCEDURE — 84703 CHORIONIC GONADOTROPIN ASSAY: CPT

## 2023-12-02 PROCEDURE — 160035 HCHG PACU - 1ST 60 MINS PHASE I: Performed by: ORTHOPAEDIC SURGERY

## 2023-12-02 PROCEDURE — 160048 HCHG OR STATISTICAL LEVEL 1-5: Performed by: ORTHOPAEDIC SURGERY

## 2023-12-02 PROCEDURE — 160041 HCHG SURGERY MINUTES - EA ADDL 1 MIN LEVEL 4: Performed by: ORTHOPAEDIC SURGERY

## 2023-12-02 PROCEDURE — A9270 NON-COVERED ITEM OR SERVICE: HCPCS | Performed by: HOSPITALIST

## 2023-12-02 PROCEDURE — 700102 HCHG RX REV CODE 250 W/ 637 OVERRIDE(OP)

## 2023-12-02 PROCEDURE — 700102 HCHG RX REV CODE 250 W/ 637 OVERRIDE(OP): Performed by: ANESTHESIOLOGY

## 2023-12-02 PROCEDURE — 160036 HCHG PACU - EA ADDL 30 MINS PHASE I: Performed by: ORTHOPAEDIC SURGERY

## 2023-12-02 PROCEDURE — A9270 NON-COVERED ITEM OR SERVICE: HCPCS | Performed by: STUDENT IN AN ORGANIZED HEALTH CARE EDUCATION/TRAINING PROGRAM

## 2023-12-02 PROCEDURE — 700102 HCHG RX REV CODE 250 W/ 637 OVERRIDE(OP): Performed by: STUDENT IN AN ORGANIZED HEALTH CARE EDUCATION/TRAINING PROGRAM

## 2023-12-02 PROCEDURE — 700111 HCHG RX REV CODE 636 W/ 250 OVERRIDE (IP): Mod: JZ | Performed by: PHYSICIAN ASSISTANT

## 2023-12-02 PROCEDURE — 700102 HCHG RX REV CODE 250 W/ 637 OVERRIDE(OP): Performed by: HOSPITALIST

## 2023-12-02 PROCEDURE — 700111 HCHG RX REV CODE 636 W/ 250 OVERRIDE (IP)

## 2023-12-02 PROCEDURE — 110371 HCHG SHELL REV 272: Performed by: ORTHOPAEDIC SURGERY

## 2023-12-02 PROCEDURE — 700105 HCHG RX REV CODE 258: Performed by: ORTHOPAEDIC SURGERY

## 2023-12-02 PROCEDURE — 160029 HCHG SURGERY MINUTES - 1ST 30 MINS LEVEL 4: Performed by: ORTHOPAEDIC SURGERY

## 2023-12-02 PROCEDURE — 770004 HCHG ROOM/CARE - ONCOLOGY PRIVATE *

## 2023-12-02 PROCEDURE — 99233 SBSQ HOSP IP/OBS HIGH 50: CPT | Performed by: STUDENT IN AN ORGANIZED HEALTH CARE EDUCATION/TRAINING PROGRAM

## 2023-12-02 PROCEDURE — 700111 HCHG RX REV CODE 636 W/ 250 OVERRIDE (IP): Mod: JZ | Performed by: ORTHOPAEDIC SURGERY

## 2023-12-02 PROCEDURE — A9270 NON-COVERED ITEM OR SERVICE: HCPCS | Performed by: ANESTHESIOLOGY

## 2023-12-02 PROCEDURE — 700101 HCHG RX REV CODE 250: Performed by: ANESTHESIOLOGY

## 2023-12-02 PROCEDURE — 700105 HCHG RX REV CODE 258: Performed by: PHYSICIAN ASSISTANT

## 2023-12-02 PROCEDURE — 700105 HCHG RX REV CODE 258: Performed by: ANESTHESIOLOGY

## 2023-12-02 PROCEDURE — 72020 X-RAY EXAM OF SPINE 1 VIEW: CPT

## 2023-12-02 RX ORDER — HYDRALAZINE HYDROCHLORIDE 20 MG/ML
5 INJECTION INTRAMUSCULAR; INTRAVENOUS
Status: DISCONTINUED | OUTPATIENT
Start: 2023-12-02 | End: 2023-12-02 | Stop reason: HOSPADM

## 2023-12-02 RX ORDER — SODIUM CHLORIDE, SODIUM LACTATE, POTASSIUM CHLORIDE, CALCIUM CHLORIDE 600; 310; 30; 20 MG/100ML; MG/100ML; MG/100ML; MG/100ML
INJECTION, SOLUTION INTRAVENOUS
Status: DISCONTINUED | OUTPATIENT
Start: 2023-12-02 | End: 2023-12-02 | Stop reason: SURG

## 2023-12-02 RX ORDER — HALOPERIDOL 5 MG/ML
1 INJECTION INTRAMUSCULAR
Status: DISCONTINUED | OUTPATIENT
Start: 2023-12-02 | End: 2023-12-02 | Stop reason: HOSPADM

## 2023-12-02 RX ORDER — SODIUM CHLORIDE, SODIUM LACTATE, POTASSIUM CHLORIDE, CALCIUM CHLORIDE 600; 310; 30; 20 MG/100ML; MG/100ML; MG/100ML; MG/100ML
INJECTION, SOLUTION INTRAVENOUS CONTINUOUS
Status: DISCONTINUED | OUTPATIENT
Start: 2023-12-02 | End: 2023-12-02 | Stop reason: HOSPADM

## 2023-12-02 RX ORDER — HYDROMORPHONE HYDROCHLORIDE 1 MG/ML
0.4 INJECTION, SOLUTION INTRAMUSCULAR; INTRAVENOUS; SUBCUTANEOUS
Status: DISCONTINUED | OUTPATIENT
Start: 2023-12-02 | End: 2023-12-02 | Stop reason: HOSPADM

## 2023-12-02 RX ORDER — KETOROLAC TROMETHAMINE 30 MG/ML
INJECTION, SOLUTION INTRAMUSCULAR; INTRAVENOUS PRN
Status: DISCONTINUED | OUTPATIENT
Start: 2023-12-02 | End: 2023-12-02 | Stop reason: SURG

## 2023-12-02 RX ORDER — ONDANSETRON 2 MG/ML
4 INJECTION INTRAMUSCULAR; INTRAVENOUS
Status: COMPLETED | OUTPATIENT
Start: 2023-12-02 | End: 2023-12-02

## 2023-12-02 RX ORDER — MEPERIDINE HYDROCHLORIDE 25 MG/ML
12.5 INJECTION INTRAMUSCULAR; INTRAVENOUS; SUBCUTANEOUS
Status: DISCONTINUED | OUTPATIENT
Start: 2023-12-02 | End: 2023-12-02 | Stop reason: HOSPADM

## 2023-12-02 RX ORDER — DIPHENHYDRAMINE HYDROCHLORIDE 50 MG/ML
12.5 INJECTION INTRAMUSCULAR; INTRAVENOUS
Status: DISCONTINUED | OUTPATIENT
Start: 2023-12-02 | End: 2023-12-02 | Stop reason: HOSPADM

## 2023-12-02 RX ORDER — OXYCODONE HCL 5 MG/5 ML
10 SOLUTION, ORAL ORAL
Status: COMPLETED | OUTPATIENT
Start: 2023-12-02 | End: 2023-12-02

## 2023-12-02 RX ORDER — ACETAMINOPHEN 500 MG
1000 TABLET ORAL ONCE
Status: DISCONTINUED | OUTPATIENT
Start: 2023-12-02 | End: 2023-12-02 | Stop reason: HOSPADM

## 2023-12-02 RX ORDER — METOPROLOL TARTRATE 1 MG/ML
1 INJECTION, SOLUTION INTRAVENOUS
Status: DISCONTINUED | OUTPATIENT
Start: 2023-12-02 | End: 2023-12-02 | Stop reason: HOSPADM

## 2023-12-02 RX ORDER — OXYCODONE HCL 5 MG/5 ML
5 SOLUTION, ORAL ORAL
Status: COMPLETED | OUTPATIENT
Start: 2023-12-02 | End: 2023-12-02

## 2023-12-02 RX ORDER — HYDROMORPHONE HYDROCHLORIDE 1 MG/ML
0.2 INJECTION, SOLUTION INTRAMUSCULAR; INTRAVENOUS; SUBCUTANEOUS
Status: DISCONTINUED | OUTPATIENT
Start: 2023-12-02 | End: 2023-12-02 | Stop reason: HOSPADM

## 2023-12-02 RX ORDER — GABAPENTIN 300 MG/1
300 CAPSULE ORAL ONCE
Status: CANCELLED | OUTPATIENT
Start: 2023-12-02 | End: 2023-12-02

## 2023-12-02 RX ORDER — MIDAZOLAM HYDROCHLORIDE 1 MG/ML
1 INJECTION INTRAMUSCULAR; INTRAVENOUS
Status: DISCONTINUED | OUTPATIENT
Start: 2023-12-02 | End: 2023-12-02 | Stop reason: HOSPADM

## 2023-12-02 RX ORDER — LIDOCAINE HYDROCHLORIDE 20 MG/ML
INJECTION, SOLUTION EPIDURAL; INFILTRATION; INTRACAUDAL; PERINEURAL PRN
Status: DISCONTINUED | OUTPATIENT
Start: 2023-12-02 | End: 2023-12-02 | Stop reason: SURG

## 2023-12-02 RX ORDER — MIDAZOLAM HYDROCHLORIDE 1 MG/ML
INJECTION INTRAMUSCULAR; INTRAVENOUS PRN
Status: DISCONTINUED | OUTPATIENT
Start: 2023-12-02 | End: 2023-12-02 | Stop reason: SURG

## 2023-12-02 RX ORDER — ALBUTEROL SULFATE 2.5 MG/3ML
2.5 SOLUTION RESPIRATORY (INHALATION)
Status: DISCONTINUED | OUTPATIENT
Start: 2023-12-02 | End: 2023-12-02 | Stop reason: HOSPADM

## 2023-12-02 RX ORDER — DEXAMETHASONE SODIUM PHOSPHATE 4 MG/ML
INJECTION, SOLUTION INTRA-ARTICULAR; INTRALESIONAL; INTRAMUSCULAR; INTRAVENOUS; SOFT TISSUE PRN
Status: DISCONTINUED | OUTPATIENT
Start: 2023-12-02 | End: 2023-12-02 | Stop reason: SURG

## 2023-12-02 RX ORDER — HYDROMORPHONE HYDROCHLORIDE 1 MG/ML
0.1 INJECTION, SOLUTION INTRAMUSCULAR; INTRAVENOUS; SUBCUTANEOUS
Status: DISCONTINUED | OUTPATIENT
Start: 2023-12-02 | End: 2023-12-02 | Stop reason: HOSPADM

## 2023-12-02 RX ORDER — LABETALOL HYDROCHLORIDE 5 MG/ML
5 INJECTION, SOLUTION INTRAVENOUS
Status: DISCONTINUED | OUTPATIENT
Start: 2023-12-02 | End: 2023-12-02 | Stop reason: HOSPADM

## 2023-12-02 RX ORDER — ROCURONIUM BROMIDE 10 MG/ML
INJECTION, SOLUTION INTRAVENOUS PRN
Status: DISCONTINUED | OUTPATIENT
Start: 2023-12-02 | End: 2023-12-02 | Stop reason: SURG

## 2023-12-02 RX ORDER — CEFAZOLIN SODIUM 1 G/3ML
INJECTION, POWDER, FOR SOLUTION INTRAMUSCULAR; INTRAVENOUS PRN
Status: DISCONTINUED | OUTPATIENT
Start: 2023-12-02 | End: 2023-12-02 | Stop reason: SURG

## 2023-12-02 RX ORDER — ONDANSETRON 2 MG/ML
INJECTION INTRAMUSCULAR; INTRAVENOUS PRN
Status: DISCONTINUED | OUTPATIENT
Start: 2023-12-02 | End: 2023-12-02 | Stop reason: SURG

## 2023-12-02 RX ORDER — EPHEDRINE SULFATE 50 MG/ML
5 INJECTION, SOLUTION INTRAVENOUS
Status: DISCONTINUED | OUTPATIENT
Start: 2023-12-02 | End: 2023-12-02 | Stop reason: HOSPADM

## 2023-12-02 RX ADMIN — PREGABALIN 25 MG: 25 CAPSULE ORAL at 04:12

## 2023-12-02 RX ADMIN — ONDANSETRON 4 MG: 2 INJECTION INTRAMUSCULAR; INTRAVENOUS at 11:28

## 2023-12-02 RX ADMIN — ROCURONIUM BROMIDE 20 MG: 50 INJECTION, SOLUTION INTRAVENOUS at 09:56

## 2023-12-02 RX ADMIN — CYCLOBENZAPRINE 10 MG: 10 TABLET, FILM COATED ORAL at 14:30

## 2023-12-02 RX ADMIN — FENTANYL CITRATE 50 MCG: 50 INJECTION, SOLUTION INTRAMUSCULAR; INTRAVENOUS at 10:39

## 2023-12-02 RX ADMIN — CYCLOBENZAPRINE 10 MG: 10 TABLET, FILM COATED ORAL at 20:17

## 2023-12-02 RX ADMIN — ACETAMINOPHEN 1000 MG: 500 TABLET ORAL at 14:30

## 2023-12-02 RX ADMIN — KETOROLAC TROMETHAMINE 30 MG: 30 INJECTION, SOLUTION INTRAMUSCULAR; INTRAVENOUS at 10:41

## 2023-12-02 RX ADMIN — HYDROMORPHONE HYDROCHLORIDE 0.4 MG: 1 INJECTION, SOLUTION INTRAMUSCULAR; INTRAVENOUS; SUBCUTANEOUS at 12:01

## 2023-12-02 RX ADMIN — OXYCODONE HYDROCHLORIDE 10 MG: 10 TABLET ORAL at 07:23

## 2023-12-02 RX ADMIN — HYDROMORPHONE HYDROCHLORIDE 0.4 MG: 1 INJECTION, SOLUTION INTRAMUSCULAR; INTRAVENOUS; SUBCUTANEOUS at 11:48

## 2023-12-02 RX ADMIN — MIDAZOLAM HYDROCHLORIDE 2 MG: 1 INJECTION, SOLUTION INTRAMUSCULAR; INTRAVENOUS at 09:12

## 2023-12-02 RX ADMIN — HYDROMORPHONE HYDROCHLORIDE 0.4 MG: 1 INJECTION, SOLUTION INTRAMUSCULAR; INTRAVENOUS; SUBCUTANEOUS at 12:36

## 2023-12-02 RX ADMIN — OXYCODONE HYDROCHLORIDE 10 MG: 10 TABLET ORAL at 18:14

## 2023-12-02 RX ADMIN — DOCUSATE SODIUM 50 MG AND SENNOSIDES 8.6 MG 2 TABLET: 8.6; 5 TABLET, FILM COATED ORAL at 17:11

## 2023-12-02 RX ADMIN — CEFAZOLIN 3 G: 1 INJECTION, POWDER, FOR SOLUTION INTRAMUSCULAR; INTRAVENOUS at 17:26

## 2023-12-02 RX ADMIN — FENTANYL CITRATE 50 MCG: 50 INJECTION, SOLUTION INTRAMUSCULAR; INTRAVENOUS at 11:31

## 2023-12-02 RX ADMIN — OXYCODONE HYDROCHLORIDE 10 MG: 5 SOLUTION ORAL at 11:25

## 2023-12-02 RX ADMIN — DOCUSATE SODIUM 50 MG AND SENNOSIDES 8.6 MG 2 TABLET: 8.6; 5 TABLET, FILM COATED ORAL at 04:12

## 2023-12-02 RX ADMIN — OMEPRAZOLE 20 MG: 20 CAPSULE, DELAYED RELEASE ORAL at 04:11

## 2023-12-02 RX ADMIN — CEFAZOLIN 3 G: 1 INJECTION, POWDER, FOR SOLUTION INTRAMUSCULAR; INTRAVENOUS at 09:18

## 2023-12-02 RX ADMIN — OXYCODONE HYDROCHLORIDE 10 MG: 10 TABLET ORAL at 21:45

## 2023-12-02 RX ADMIN — FENTANYL CITRATE 100 MCG: 50 INJECTION, SOLUTION INTRAMUSCULAR; INTRAVENOUS at 10:57

## 2023-12-02 RX ADMIN — LIDOCAINE 2 PATCH: 4 PATCH TOPICAL at 04:12

## 2023-12-02 RX ADMIN — OXYCODONE HYDROCHLORIDE 10 MG: 10 TABLET ORAL at 15:14

## 2023-12-02 RX ADMIN — HYDROMORPHONE HYDROCHLORIDE 0.2 MG: 1 INJECTION, SOLUTION INTRAMUSCULAR; INTRAVENOUS; SUBCUTANEOUS at 12:07

## 2023-12-02 RX ADMIN — SUGAMMADEX 200 MG: 100 INJECTION, SOLUTION INTRAVENOUS at 10:56

## 2023-12-02 RX ADMIN — HYDROMORPHONE HYDROCHLORIDE 0.2 MG: 1 INJECTION, SOLUTION INTRAMUSCULAR; INTRAVENOUS; SUBCUTANEOUS at 12:47

## 2023-12-02 RX ADMIN — IBUPROFEN 600 MG: 400 TABLET, FILM COATED ORAL at 14:31

## 2023-12-02 RX ADMIN — OXYCODONE HYDROCHLORIDE 10 MG: 10 TABLET ORAL at 04:12

## 2023-12-02 RX ADMIN — FENTANYL CITRATE 50 MCG: 50 INJECTION, SOLUTION INTRAMUSCULAR; INTRAVENOUS at 09:57

## 2023-12-02 RX ADMIN — DEXAMETHASONE 4 MG: 4 TABLET ORAL at 04:12

## 2023-12-02 RX ADMIN — IBUPROFEN 600 MG: 400 TABLET, FILM COATED ORAL at 20:18

## 2023-12-02 RX ADMIN — HYDROMORPHONE HYDROCHLORIDE 0.5 MG: 1 INJECTION, SOLUTION INTRAMUSCULAR; INTRAVENOUS; SUBCUTANEOUS at 20:36

## 2023-12-02 RX ADMIN — ROCURONIUM BROMIDE 20 MG: 50 INJECTION, SOLUTION INTRAVENOUS at 10:25

## 2023-12-02 RX ADMIN — FENTANYL CITRATE 100 MCG: 50 INJECTION, SOLUTION INTRAMUSCULAR; INTRAVENOUS at 09:18

## 2023-12-02 RX ADMIN — HYDROMORPHONE HYDROCHLORIDE 0.4 MG: 1 INJECTION, SOLUTION INTRAMUSCULAR; INTRAVENOUS; SUBCUTANEOUS at 12:42

## 2023-12-02 RX ADMIN — FENTANYL CITRATE 50 MCG: 50 INJECTION, SOLUTION INTRAMUSCULAR; INTRAVENOUS at 11:36

## 2023-12-02 RX ADMIN — SODIUM CHLORIDE, POTASSIUM CHLORIDE, SODIUM LACTATE AND CALCIUM CHLORIDE: 600; 310; 30; 20 INJECTION, SOLUTION INTRAVENOUS at 09:13

## 2023-12-02 RX ADMIN — DEXAMETHASONE SODIUM PHOSPHATE 8 MG: 4 INJECTION INTRA-ARTICULAR; INTRALESIONAL; INTRAMUSCULAR; INTRAVENOUS; SOFT TISSUE at 09:18

## 2023-12-02 RX ADMIN — PROPOFOL 200 MG: 10 INJECTION, EMULSION INTRAVENOUS at 09:18

## 2023-12-02 RX ADMIN — ROCURONIUM BROMIDE 50 MG: 50 INJECTION, SOLUTION INTRAVENOUS at 09:18

## 2023-12-02 RX ADMIN — SODIUM CHLORIDE, POTASSIUM CHLORIDE, SODIUM LACTATE AND CALCIUM CHLORIDE: 600; 310; 30; 20 INJECTION, SOLUTION INTRAVENOUS at 10:39

## 2023-12-02 RX ADMIN — ONDANSETRON 4 MG: 2 INJECTION INTRAMUSCULAR; INTRAVENOUS at 10:41

## 2023-12-02 RX ADMIN — LIDOCAINE HYDROCHLORIDE 100 MG: 20 INJECTION, SOLUTION EPIDURAL; INFILTRATION; INTRACAUDAL at 09:18

## 2023-12-02 RX ADMIN — ACETAMINOPHEN 1000 MG: 500 TABLET ORAL at 20:17

## 2023-12-02 RX ADMIN — PREGABALIN 25 MG: 25 CAPSULE ORAL at 17:11

## 2023-12-02 ASSESSMENT — PAIN DESCRIPTION - PAIN TYPE
TYPE: ACUTE PAIN
TYPE: ACUTE PAIN;SURGICAL PAIN
TYPE: ACUTE PAIN;SURGICAL PAIN
TYPE: ACUTE PAIN
TYPE: ACUTE PAIN;SURGICAL PAIN
TYPE: ACUTE PAIN;SURGICAL PAIN
TYPE: ACUTE PAIN
TYPE: ACUTE PAIN;SURGICAL PAIN
TYPE: ACUTE PAIN
TYPE: ACUTE PAIN;SURGICAL PAIN
TYPE: ACUTE PAIN
TYPE: ACUTE PAIN
TYPE: ACUTE PAIN;SURGICAL PAIN

## 2023-12-02 ASSESSMENT — ENCOUNTER SYMPTOMS
SPUTUM PRODUCTION: 0
COUGH: 0
BLOOD IN STOOL: 0
ORTHOPNEA: 0
DIARRHEA: 0
DEPRESSION: 0
CONSTIPATION: 0
HEMOPTYSIS: 0
SENSORY CHANGE: 1
NERVOUS/ANXIOUS: 1
NECK PAIN: 0
VOMITING: 0
BACK PAIN: 1
PALPITATIONS: 0
ABDOMINAL PAIN: 0
NAUSEA: 0
MYALGIAS: 0

## 2023-12-02 ASSESSMENT — PAIN SCALES - GENERAL: PAIN_LEVEL: 3

## 2023-12-02 NOTE — ASSESSMENT & PLAN NOTE
MRI demonstrates L4-L5 and L5-S1 extrusions with L4-L5 compressing Left descending nerve  Spinal surgery consulted, underwent L4-L5 discectomy with microdissection by Dr. Gimenez 12/2  Post-procedure PT/OT 12/3

## 2023-12-02 NOTE — ANESTHESIA PREPROCEDURE EVALUATION
Case: [id number] Date/Time: 12/02/23 0903    Procedure: L4-L5 MICRODISCECTOMY (Left)    Location: [facility] OR 05 / SURGERY [facility]    Surgeons: Rocco Gimenez M.D.            Relevant Problems   No relevant active problems       Physical Exam    Airway   Mallampati: II  TM distance: >3 FB  Neck ROM: full       Cardiovascular - normal exam  Rhythm: regular  Rate: normal  (-) murmur     Dental - normal exam           Pulmonary - normal exam  Breath sounds clear to auscultation     Abdominal   (+) obese     Neurological - normal exam                   Anesthesia Plan    ASA 2       Plan - general       Airway plan will be ETT          Induction: intravenous    Postoperative Plan: Postoperative administration of opioids is intended.    Pertinent diagnostic labs and testing reviewed    Informed Consent:    Anesthetic plan and risks discussed with patient.    Use of blood products discussed with: patient whom consented to blood products.

## 2023-12-02 NOTE — CARE PLAN
Mother was informed and is appreciative, she was just about to call when I called. I let her know we were sending it in and that it may take a half hour or so for it to come in and then they'll have to fill it. She confirmed understanding and was appreciative. The patient is Watcher - Medium risk of patient condition declining or worsening    Shift Goals  Clinical Goals: Pain management  Patient Goals: Pain management, figure out plan  Family Goals: IVAN    Progress made toward(s) clinical / shift goals:    Problem: Pain - Standard  Goal: Alleviation of pain or a reduction in pain to the patient’s comfort goal  Outcome: Progressing  Note: Pain assessed using 0-10 verbal pain scale, PRN and scheduled pain medications administered per MAR.      Problem: Knowledge Deficit - Standard  Goal: Patient and family/care givers will demonstrate understanding of plan of care, disease process/condition, diagnostic tests and medications  Outcome: Progressing  Note: Pt updated on plan of care, pt states understanding for plan of pain management and plan for surgery tomorrow. All questions answered at this time.      Problem: Fall Risk  Goal: Patient will remain free from falls  Outcome: Progressing  Note: Bed locked and in lowest position, personal belongings within reach, call light within reach, nonslip socks on, pt uses call light appropriately.        Patient is not progressing towards the following goals: N/A

## 2023-12-02 NOTE — OP REPORT
ORTHO SPINE OPERATIVE REPORT  [facility] [place]        Preoperative diagnosis:    1.  Large left-sided L4-L5 herniated nucleus pulposus  2.  Left-sided L4-L5 severe lateral recess stenosis  3.  Intractable left lower extremity radiculopathy consistent with L5  4.  Multilevel lumbar degenerative disc disease  5.  Chronic low back pain  6.  Morbid obesity BMI greater than 40      Postoperative diagnosis:    1.  Large left-sided L4-L5 herniated nucleus pulposus  2.  Left-sided L4-L5 severe lateral recess stenosis  3.  Intractable left lower extremity radiculopathy consistent with L5  4.  Multilevel lumbar degenerative disc disease  5.  Chronic low back pain  6.  Morbid obesity BMI greater than 40    Procedure:    1.  Left L4 laminotomy with left L4-L5 microdiscectomy  2.  Modifier 22 secondary to morbid obesity BMI greater than 40    Surgeon:        Rocco Gimenez MD    Assistant:    Jose CAMPOS      Anesthesia:      General via endotracheal tube    Intravenous fluids:       1000 cc lactated Ringer's    Estimated blood loss:      Less than 10 cc    Complications:       None    Indications:    Ms. Luciano is a 23-year-old female with a past medical history significant for morbid obesity with a BMI greater than 40 and asthma.    This patient presents to the [facility] emergency room with complaints of severe low back pain with radiation down the left lower extremity.    The patient reports that she had been complaining of low back pain with pain down the left lower extremity since April.  She attributes the onset of this pain due to her pregnancy.    The patient reporting significant difficulty with activities of daily living and maintaining activities secondary to the pain.    She had previously presented to [facility] ER emergency room with similar complaints several weeks ago.    She was started on gabapentin.  She however she only took this for approximately 2 weeks because she  could not tolerate how it made her feel.    Patient does have a history of vaping.    The patient lives with her boyfriend and her child.    Patient states that she is unable to get up and walk and ambulate normally.    She was not progressing in the hospital.  She was laying in bed most of the day.    We discussed her MRI findings.  MRI revealed a large left-sided L4-L5 herniated nucleus pulposus with impingement of the traversing L5 nerve root.    The patient's complaints of pain were in the left lower extremity down the lateral aspect of the lower extremity consistent with an L5 radiculopathy.    We discussed nonoperative treatment.  We discussed the risk, benefits the alternatives associate with operative treatment.  She voiced understanding.  She wished to proceed.  Signed consent was obtained.    We discussed realistic expectations.    We discussed the need to avoid use of narcotic pain medications and the risk associated with these.    Operative technique:    Patient patient was taken the operative suite.  General anesthesia was induced via an endotracheal tube.  Patient was positioned prone on Jose David table.  Bony prominences were well-padded using a crate foam.  Bilateral PAS boots were utilized and operational throughout the case.    Lumbar spine was cleansed thoroughly using rubbing alcohol.  The lumbar spine was cleansed thoroughly using Betadine paint.  A full Betadine scrub was then performed.  This followed by DuraPrep and Nestor.    Timeout was called.  The patient did receive 3 g of Ancef given intravenously at the start of the case.    A spinal needle was placed at the anticipated level of the incision.  An intraoperative x-ray was obtained to confirm proper localization.    Based on our interpretation we were at the L4-L5 disc space.    A 10 blade scalpel was then utilized to create a midline skin incision approximately 2 cm in length.  Sharp dissection was carried down through the dermis.   Hemostasis controlled using Bovie cautery.  Subcutaneous tissues were dissected in line with skin incision down to the thoracolumbar fascia.  The thoracolumbar fascia was incised just left of midline at the tip of the spinous process of L4 and L5.  Subperiosteal dissection was carried down the lateral aspect of the spinous processes of L4 and L5.  Care was taken to maintain the integrity of the facet joint capsule.    A [place] retractor was placed.  Complete exposure of the L4 and L5 hemilamina on the left side was accomplished.    A second intraoperative x-ray was obtained to confirm we were at the proper levels.    Midas Frantz bur with an AM 35 bit was then used to thin the caudal aspect of the L4 hemilamina and the cephalad aspect of the L5 hemilamina.  A very very slight facetectomy medially was performed.  We were very mindful in regards to her obesity and the risk of instability.    A angled 3 oh curette from the cromolyn instrumentation set was used to take down the hypertrophied ligamentum flavum from the ventral surface of the L4 hemilamina.  This allowed us to utilize a 4 mm Kerrison to perform a laminotomy of L4.  We did again take a very slight amount of the medial facet.  We then circled around and took the cephalad portion of the L5 hemilamina.    The traversing L5 nerve root was identified.  Hemostasis controlled using bipolar cautery and Gelfoam with thrombin.    The traversing L5 nerve root was mobilized medially.  This revealed a large herniated nucleus pulposus fragment sitting within the lateral recess.  The remaining fibers of the annulus were pierced with a Penfield 4.  Nucleus pulposus material began to extrude itself through this annulotomy.    We enlarged the annulotomy with a 15 blade scalpel on the long handle.  We were able to pluck a large single fragment nucleus pulposus material.  This was saved.  We then probed the annulotomy site for a other loose fragments.  We could find  none.    Hemostasis again controlled using bipolar cautery and Gelfoam with thrombin.    The wound was then irrigated with copious amounts normal saline.  The wound was irrigated with copious amounts normal saline mixed with Betadine.    A final x-ray with a Robles ball-tipped probe in place was obtained.    A medium Hemovac drain was then placed deep to the thoracolumbar fascia and juxtaposed next to the laminotomy defects.    The wound was then closed in layered fashion using 0 Vicryl 2-0 Vicryl and 3-0 Monocryl in a subcuticular fashion.    Patient tolerated procedure well.  There are no complications.  A sterile dressing consisting of 4 x 4's and Medipore tape was placed.    The patient was extubated without difficulty and taken to recovery room in stable condition.  She did receive a one-time dose of Toradol 30 mg given on in the recovery room.

## 2023-12-02 NOTE — OR NURSING
1143: Pt's paulette Nara phoned, voice message left regarding pt status in Recovery and POC.   Pt's sister Deann phoned and updated on pt status in Recovery and POC.  Pt returning to room R300.  1304: Anra updated on pt status in Recovery and return to room R300.  Nara waiting in pt room.

## 2023-12-02 NOTE — OR SURGEON
ORTHO SPINE Immediate Post OP Note  [facility] [place]          PreOp Diagnosis:     1.  Large left-sided L4-L5 herniated nucleus pulposus  2.  Left-sided L4-L5 severe lateral recess stenosis  3.  Intractable left lower extremity radiculopathy consistent with L5  4.  Multilevel lumbar degenerative disc disease  5.  Chronic low back pain  6.  Morbid obesity BMI greater than 40      PostOp Diagnosis:     1.  Large left-sided L4-L5 herniated nucleus pulposus  2.  Left-sided L4-L5 severe lateral recess stenosis  3.  Intractable left lower extremity radiculopathy consistent with L5  4.  Multilevel lumbar degenerative disc disease  5.  Chronic low back pain  6.  Morbid obesity BMI greater than 40      Procedure(s):     LUMBAR 4-LUMBAR 5 MICRODISCECTOMY, LUMBAR 4 LAMINOTOMY - Wound Class: Clean  Modifier 22 morbid obesity BMI greater than 40    Surgeon(s):  Rocco Gimenez M.D.    Anesthesiologist/Type of Anesthesia:  Anesthesiologist: Wyatt Castrejon M.D./General    Surgical Staff:  Assistant: ISRAEL Vora  Circulator: Nanci Dodson R.N.; Soila Banks R.N.  Scrub Person: Thalia Martinez    Specimens removed if any:  * No specimens in log *    Estimated Blood Loss:   Less than 10 cc    Findings:    Large herniated nucleus pulposus left-sided L4-L5    Complications:    None        12/2/2023 10:54 AM Rocco Gimenez M.D.

## 2023-12-02 NOTE — ANESTHESIA TIME REPORT
Anesthesia Start and Stop Event Times       Date Time Event    12/2/2023 0821 Ready for Procedure     0913 Anesthesia Start     1112 Anesthesia Stop          Responsible Staff  12/02/23      Name Role Begin End    Wyatt Castrejon M.D. Anesth 0913 1112          Overtime Reason:  no overtime (within assigned shift)    Comments:

## 2023-12-02 NOTE — OR NURSING
Pt on 2 L NC.  Nausea treated with Zofran, tolerating PO fluids and medication.  Lower back surgical dressing CDI.  Hemovac compressed, patent and draining serosanguineous fluid.  Difficult to bring pt's pain down to a tolerable level. Surgical pain now tolerable per pt, 3/10.    LLE strength 5/5.  RLE strength 5/5. Pt denies Bilateral lower extremity numbness or weakness. VSS, afebrile, QUINONES, A/O x4.     Nose ring in clear bag on pt chart.   Oxygen tank 75% full.

## 2023-12-02 NOTE — CARE PLAN
The patient is Watcher - Medium risk of patient condition declining or worsening    Shift Goals  Clinical Goals: Pain management, procedure  Patient Goals: Procedure, pain management, rest  Family Goals: NA    Progress made toward(s) clinical / shift goals:    Problem: Pain - Standard  Goal: Alleviation of pain or a reduction in pain to the patient’s comfort goal  Outcome: Progressing  Note: Pain assessed using 0-10 verbal pain scale, scheduled and PRN pain medications/muscle relaxers administered per MAR.      Problem: Knowledge Deficit - Standard  Goal: Patient and family/care givers will demonstrate understanding of plan of care, disease process/condition, diagnostic tests and medications  Outcome: Progressing  Note: Pt updated on plan of care, pt states understanding for plan of pain management post procedure, monitoring hemovac/incision site, and increased mobility. All questions answered at this time.      Problem: Mobility  Goal: Patient's capacity to carry out activities will improve  Outcome: Progressing  Note: Pt up to bedside commode post op with x1 assist.        Patient is not progressing towards the following goals: N/A

## 2023-12-02 NOTE — THERAPY
Physical Therapy Contact Note    Patient Name: Hoda Summers  Age:  23 y.o., Sex:  female  Medical Record #: [id number]  Today's Date: 12/2/2023    PT consult received, pt surgery today will follow up post;    Deedee LÓPEZ, PT,  [phone]

## 2023-12-02 NOTE — ANESTHESIA PROCEDURE NOTES
Airway    Date/Time: 12/2/2023 9:20 AM    Performed by: Wyatt Castrejon M.D.  Authorized by: Wyatt Castrejon M.D.    Location:  OR  Urgency:  Elective  Indications for Airway Management:  Anesthesia      Spontaneous Ventilation: absent    Sedation Level:  Deep  Preoxygenated: Yes    Patient Position:  Sniffing  Mask Difficulty Assessment:  1 - vent by mask  Final Airway Type:  Endotracheal airway  Final Endotracheal Airway:  ETT  Cuffed: Yes    Technique Used for Successful ETT Placement:  Direct laryngoscopy    Insertion Site:  Oral  Blade Type:  Gurpreet  Laryngoscope Blade/Videolaryngoscope Blade Size:  3  ETT Size (mm):  7.0  Measured from:  Teeth  ETT to Teeth (cm):  22  Placement Verified by: auscultation and capnometry    Cormack-Lehane Classification:  Grade IIa - partial view of glottis  Number of Attempts at Approach:  1

## 2023-12-02 NOTE — ANESTHESIA POSTPROCEDURE EVALUATION
Patient: Kristie Luciano    Procedure Summary       Date: 12/02/23 Room / Location: [place] [id number] / SURGERY [facility]    Anesthesia Start: 0913 Anesthesia Stop: 1112    Procedure: LUMBAR 4-LUMBAR 5 MICRODISCECTOMY, LUMBAR 4 LAMINOTOMY (Left: Back) Diagnosis: (LEFT L4-L5 HERNIATED DISC, LEFT LEG INTRACTABLE RADICULOPATHY)    Surgeons: Rocco Gimenez M.D. Responsible Provider: Wyatt Castrejon M.D.    Anesthesia Type: general ASA Status: 2            Final Anesthesia Type: general  Last vitals  BP   Blood Pressure: (!) 145/89 (RN notified )    Temp   36.8 °C (98.2 °F)    Pulse   79   Resp   15    SpO2   96 %      Anesthesia Post Evaluation    Patient location during evaluation: PACU  Patient participation: complete - patient participated  Level of consciousness: awake and alert  Pain score: 3    Airway patency: patent  Anesthetic complications: no  Cardiovascular status: hemodynamically stable  Respiratory status: acceptable  Hydration status: euvolemic    PONV: none          No notable events documented.     Nurse Pain Score: 3 (NPRS)

## 2023-12-02 NOTE — PROGRESS NOTES
"ORTHO SPINE Progress Note  [facility] Spine [place]        Date of Service: 12/2/2023    Chief Complaint    23 y.o. female admitted 11/28/2023 with intractable low back pain with intractable pain down the left lower extremity.    The patient's pain radiates into the left hip and then down the lateral aspect of the left leg to the ankle.    The patient has been slow to progress with physical therapy occupational therapy and just mobility in general.    She states that she did do somewhat better yesterday because her \"pain was controlled.  \".    The patient was seen today before surgery.  We had a long discussion with regard to her MRI findings.    The patient has multilevel lumbar degenerative disc disease.  She has a large left-sided L4-L5 herniated nucleus pulposus which results in severe left-sided L4-L5 lateral recess stenosis and impingement of the traversing L5 nerve root.    The patient has pain down the lateral aspect of the left leg consistent with an L5 radiculopathy.    Today we had a long discussion with regard to the risk, the benefits the alternatives associate with operative treatment.  We discussed nonoperative treatment.    Her boyfriend Shivam was on FaceTime throughout this discussion.    The patient was made aware of the ramifications of the degenerative changes.    We discussed realistic expectations.  She states that she wants her back pain gone.  I informed her that the surgery that is being proposed today is not surgery to treat back pain.  In order to treat her back pain it may involve a fusion which she is way too young to pursue.  She would require a multilevel lumbar fusion.  At 23 years of age this would be completely unnecessary.    She will have to deal with some back pain due to her obesity as well as her degenerative changes.  However this should not be so severe that it is on manageable.    The patient was encouraged to stop use of a vape.    We discussed causes of degenerative " changes.  She would also benefit from weight reduction.  Controlling her weight.    We discussed the risk associated with use of narcotics.  She is to avoid use at all cost.    She is to be active as soon as surgery is completed and she is awake from anesthesia.  She is to be setting goals for herself up walking laps around the nurses station.        Interval Problem Update  Unchanged    Consultants/Specialty  Hospitalist    Disposition  Good        ROS   Physical Exam  Laboratory/Imaging   Hemodynamics  Temp (24hrs), Av.4 °C (97.6 °F), Min:36.1 °C (97 °F), Max:36.7 °C (98 °F)   Temperature: 36.6 °C (97.9 °F)  Pulse  Av.9  Min: 52  Max: 84    Blood Pressure: 127/77      Respiratory      Respiration: 16, Pulse Oximetry: 97 %        RUL Breath Sounds: Clear, RML Breath Sounds: Clear, RLL Breath Sounds: Clear, RUSS Breath Sounds: Clear, LLL Breath Sounds: Clear    Fluids  No intake or output data in the 24 hours ending 23 0833    Nutrition  Orders Placed This Encounter   Procedures    Diet NPO Restrict to: Strict     Standing Status:   Standing     Number of Occurrences:   8     Order Specific Question:   Diet NPO Restrict to:     Answer:   Strict [1]     Physical Exam    General: Patient is a pleasant 23-year-old female who appears stated age.  She is laying in her hospital bed.    Mental status: Patient is alert and oriented x 4.    Lungs: Bilateral chest expansion.  She has no active cough.  She is not labored in her breathing.  Her respiratory rate appears normal.    Abdomen: Soft nontender nondistended.    Extremities:    Patient is resistant to comply with strength testing of the left lower extremity.    Patient states that resisting dorsiflexion or plantarflexion causes significant pain.  Therefore she does not fully dorsiflex when encouraged multiple times.  She appears to have some weakness in dorsiflexion.  Some weakness in extensor houses longus.    She complains of pain with straight leg raise  test.    Calves are soft nontender.    Lumbar spine is soft and supple.    The patient was asked to roll on her right side so I can sterling her back she did so very slowly.  She was grimacing in pain throughout this process.                               Assessment/Plan     No new Assessment & Plan notes have been filed under this hospital service since the last note was generated.  Service: Surgery Orthopedic      1.  Left-sided L4-L5 herniated nucleus pulposus  2.  Left-sided L4-L5 lateral recess stenosis  3.  Intractable low back pain  4.  Intractable left lower extremity pain consistent with L5 radiculopathy  5.  Morbid obesity  6.  Multilevel lumbar degenerative disc disease  7.  Chronic low back pain      Plan:    The patient was made aware of the risk, the benefits the alternatives associate with a lumbar laminotomy with microdiscectomy.  We discussed realistic expectations.    I am somewhat concerned with regard to the amount of pain she has currently and how disabling it is for her.  I am concerned about her response to surgery.    I have conveyed this to the patient.    This will not be as predictable as most patients.    We will proceed as planned.    The patient will be taken to the operating room and proceed with a    Left L4 laminotomy with left-sided L4-L5 microdiscectomy.    Rocco Gimenez MD.        Quality-Core Measures

## 2023-12-03 ENCOUNTER — PHARMACY VISIT (OUTPATIENT)
Dept: PHARMACY | Facility: MEDICAL CENTER | Age: 23
End: 2023-12-03
Payer: COMMERCIAL

## 2023-12-03 VITALS
RESPIRATION RATE: 16 BRPM | TEMPERATURE: 98 F | OXYGEN SATURATION: 99 % | DIASTOLIC BLOOD PRESSURE: 69 MMHG | WEIGHT: 270.06 LBS | BODY MASS INDEX: 40 KG/M2 | SYSTOLIC BLOOD PRESSURE: 133 MMHG | HEIGHT: 69 IN | HEART RATE: 81 BPM

## 2023-12-03 PROBLEM — M54.9 INTRACTABLE BACK PAIN: Status: RESOLVED | Noted: 2023-11-28 | Resolved: 2023-12-03

## 2023-12-03 PROBLEM — M51.369 BULGING OF INTERVERTEBRAL DISC BETWEEN L4 AND L5: Status: RESOLVED | Noted: 2023-12-01 | Resolved: 2023-12-03

## 2023-12-03 PROBLEM — M51.36 BULGING OF INTERVERTEBRAL DISC BETWEEN L4 AND L5: Status: RESOLVED | Noted: 2023-12-01 | Resolved: 2023-12-03

## 2023-12-03 LAB
ALBUMIN SERPL BCP-MCNC: 3.7 G/DL (ref 3.2–4.9)
ALBUMIN/GLOB SERPL: 1.4 G/DL
ALP SERPL-CCNC: 63 U/L (ref 30–99)
ALT SERPL-CCNC: 53 U/L (ref 2–50)
ANION GAP SERPL CALC-SCNC: 11 MMOL/L (ref 7–16)
AST SERPL-CCNC: 31 U/L (ref 12–45)
BILIRUB SERPL-MCNC: 0.2 MG/DL (ref 0.1–1.5)
BUN SERPL-MCNC: 9 MG/DL (ref 8–22)
CALCIUM ALBUM COR SERPL-MCNC: 8.6 MG/DL (ref 8.5–10.5)
CALCIUM SERPL-MCNC: 8.4 MG/DL (ref 8.5–10.5)
CHLORIDE SERPL-SCNC: 104 MMOL/L (ref 96–112)
CO2 SERPL-SCNC: 23 MMOL/L (ref 20–33)
CREAT SERPL-MCNC: 0.66 MG/DL (ref 0.5–1.4)
ERYTHROCYTE [DISTWIDTH] IN BLOOD BY AUTOMATED COUNT: 40.7 FL (ref 35.9–50)
GFR SERPLBLD CREATININE-BSD FMLA CKD-EPI: 126 ML/MIN/1.73 M 2
GLOBULIN SER CALC-MCNC: 2.6 G/DL (ref 1.9–3.5)
GLUCOSE SERPL-MCNC: 104 MG/DL (ref 65–99)
HCT VFR BLD AUTO: 43.9 % (ref 37–47)
HGB BLD-MCNC: 14.2 G/DL (ref 12–16)
MCH RBC QN AUTO: 28.1 PG (ref 27–33)
MCHC RBC AUTO-ENTMCNC: 32.3 G/DL (ref 32.2–35.5)
MCV RBC AUTO: 86.9 FL (ref 81.4–97.8)
PLATELET # BLD AUTO: 325 K/UL (ref 164–446)
PMV BLD AUTO: 10.1 FL (ref 9–12.9)
POTASSIUM SERPL-SCNC: 4 MMOL/L (ref 3.6–5.5)
PROT SERPL-MCNC: 6.3 G/DL (ref 6–8.2)
RBC # BLD AUTO: 5.05 M/UL (ref 4.2–5.4)
SODIUM SERPL-SCNC: 138 MMOL/L (ref 135–145)
WBC # BLD AUTO: 13.3 K/UL (ref 4.8–10.8)

## 2023-12-03 PROCEDURE — A9270 NON-COVERED ITEM OR SERVICE: HCPCS | Performed by: ORTHOPAEDIC SURGERY

## 2023-12-03 PROCEDURE — 97166 OT EVAL MOD COMPLEX 45 MIN: CPT

## 2023-12-03 PROCEDURE — 700105 HCHG RX REV CODE 258: Performed by: PHYSICIAN ASSISTANT

## 2023-12-03 PROCEDURE — 36415 COLL VENOUS BLD VENIPUNCTURE: CPT

## 2023-12-03 PROCEDURE — RXMED WILLOW AMBULATORY MEDICATION CHARGE: Performed by: STUDENT IN AN ORGANIZED HEALTH CARE EDUCATION/TRAINING PROGRAM

## 2023-12-03 PROCEDURE — 700111 HCHG RX REV CODE 636 W/ 250 OVERRIDE (IP): Mod: JZ | Performed by: PHYSICIAN ASSISTANT

## 2023-12-03 PROCEDURE — 700102 HCHG RX REV CODE 250 W/ 637 OVERRIDE(OP): Performed by: STUDENT IN AN ORGANIZED HEALTH CARE EDUCATION/TRAINING PROGRAM

## 2023-12-03 PROCEDURE — 700102 HCHG RX REV CODE 250 W/ 637 OVERRIDE(OP): Performed by: HOSPITALIST

## 2023-12-03 PROCEDURE — A9270 NON-COVERED ITEM OR SERVICE: HCPCS | Performed by: STUDENT IN AN ORGANIZED HEALTH CARE EDUCATION/TRAINING PROGRAM

## 2023-12-03 PROCEDURE — A9270 NON-COVERED ITEM OR SERVICE: HCPCS | Performed by: HOSPITALIST

## 2023-12-03 PROCEDURE — 97162 PT EVAL MOD COMPLEX 30 MIN: CPT

## 2023-12-03 PROCEDURE — 85027 COMPLETE CBC AUTOMATED: CPT

## 2023-12-03 PROCEDURE — 97535 SELF CARE MNGMENT TRAINING: CPT

## 2023-12-03 PROCEDURE — 700102 HCHG RX REV CODE 250 W/ 637 OVERRIDE(OP)

## 2023-12-03 PROCEDURE — 80053 COMPREHEN METABOLIC PANEL: CPT

## 2023-12-03 PROCEDURE — 700102 HCHG RX REV CODE 250 W/ 637 OVERRIDE(OP): Performed by: ORTHOPAEDIC SURGERY

## 2023-12-03 PROCEDURE — 99239 HOSP IP/OBS DSCHRG MGMT >30: CPT | Performed by: STUDENT IN AN ORGANIZED HEALTH CARE EDUCATION/TRAINING PROGRAM

## 2023-12-03 PROCEDURE — A9270 NON-COVERED ITEM OR SERVICE: HCPCS

## 2023-12-03 RX ORDER — HYDROCODONE BITARTRATE AND ACETAMINOPHEN 5; 325 MG/1; MG/1
1 TABLET ORAL EVERY 4 HOURS PRN
Status: DISCONTINUED | OUTPATIENT
Start: 2023-12-03 | End: 2023-12-03

## 2023-12-03 RX ORDER — ACETAMINOPHEN 500 MG
1000 TABLET ORAL 3 TIMES DAILY
COMMUNITY
Start: 2023-12-03

## 2023-12-03 RX ORDER — IBUPROFEN 600 MG/1
TABLET ORAL
Qty: 30 TABLET | Refills: 1 | Status: SHIPPED | OUTPATIENT
Start: 2023-12-03 | End: 2024-01-07

## 2023-12-03 RX ORDER — OXYCODONE HYDROCHLORIDE 5 MG/1
5-10 TABLET ORAL EVERY 4 HOURS PRN
Qty: 20 TABLET | Refills: 0 | Status: SHIPPED | OUTPATIENT
Start: 2023-12-03 | End: 2023-12-08

## 2023-12-03 RX ORDER — PREGABALIN 25 MG/1
25 CAPSULE ORAL 3 TIMES DAILY
Qty: 90 CAPSULE | Refills: 0 | Status: SHIPPED | OUTPATIENT
Start: 2023-12-03 | End: 2024-01-02

## 2023-12-03 RX ORDER — LIDOCAINE 4 G/G
2 PATCH TOPICAL EVERY 24 HOURS
Qty: 10 PATCH | Refills: 0 | Status: SHIPPED | OUTPATIENT
Start: 2023-12-04 | End: 2023-12-09

## 2023-12-03 RX ORDER — DEXAMETHASONE 4 MG/1
4 TABLET ORAL 2 TIMES DAILY
Qty: 6 TABLET | Refills: 0 | Status: SHIPPED | OUTPATIENT
Start: 2023-12-03 | End: 2023-12-06

## 2023-12-03 RX ORDER — CYCLOBENZAPRINE HCL 10 MG
TABLET ORAL
Qty: 60 TABLET | Refills: 0 | Status: SHIPPED | OUTPATIENT
Start: 2023-12-03 | End: 2024-01-07

## 2023-12-03 RX ORDER — OXYCODONE HYDROCHLORIDE 5 MG/1
5 TABLET ORAL EVERY 4 HOURS PRN
Status: DISCONTINUED | OUTPATIENT
Start: 2023-12-03 | End: 2023-12-03 | Stop reason: HOSPADM

## 2023-12-03 RX ORDER — ALBUTEROL SULFATE 90 UG/1
2 AEROSOL, METERED RESPIRATORY (INHALATION) EVERY 4 HOURS PRN
Qty: 8.5 G | Refills: 1 | Status: SHIPPED | OUTPATIENT
Start: 2023-12-03

## 2023-12-03 RX ORDER — OXYCODONE HYDROCHLORIDE 10 MG/1
10 TABLET ORAL EVERY 4 HOURS PRN
Status: DISCONTINUED | OUTPATIENT
Start: 2023-12-03 | End: 2023-12-03 | Stop reason: HOSPADM

## 2023-12-03 RX ADMIN — OMEPRAZOLE 20 MG: 20 CAPSULE, DELAYED RELEASE ORAL at 04:37

## 2023-12-03 RX ADMIN — OXYCODONE HYDROCHLORIDE 10 MG: 10 TABLET ORAL at 11:46

## 2023-12-03 RX ADMIN — HYDROCODONE BITARTRATE AND ACETAMINOPHEN 1 TABLET: 5; 325 TABLET ORAL at 08:56

## 2023-12-03 RX ADMIN — ACETAMINOPHEN 1000 MG: 500 TABLET ORAL at 08:57

## 2023-12-03 RX ADMIN — DEXAMETHASONE 4 MG: 4 TABLET ORAL at 11:46

## 2023-12-03 RX ADMIN — PREGABALIN 25 MG: 25 CAPSULE ORAL at 11:46

## 2023-12-03 RX ADMIN — PREGABALIN 25 MG: 25 CAPSULE ORAL at 04:37

## 2023-12-03 RX ADMIN — OXYCODONE HYDROCHLORIDE 10 MG: 10 TABLET ORAL at 00:54

## 2023-12-03 RX ADMIN — IBUPROFEN 600 MG: 400 TABLET, FILM COATED ORAL at 08:56

## 2023-12-03 RX ADMIN — CYCLOBENZAPRINE 10 MG: 10 TABLET, FILM COATED ORAL at 08:57

## 2023-12-03 RX ADMIN — DEXAMETHASONE 4 MG: 4 TABLET ORAL at 04:37

## 2023-12-03 RX ADMIN — OXYCODONE HYDROCHLORIDE 10 MG: 10 TABLET ORAL at 04:35

## 2023-12-03 RX ADMIN — CEFAZOLIN 3 G: 1 INJECTION, POWDER, FOR SOLUTION INTRAMUSCULAR; INTRAVENOUS at 00:55

## 2023-12-03 RX ADMIN — DOCUSATE SODIUM 50 MG AND SENNOSIDES 8.6 MG 2 TABLET: 8.6; 5 TABLET, FILM COATED ORAL at 04:37

## 2023-12-03 ASSESSMENT — COGNITIVE AND FUNCTIONAL STATUS - GENERAL
HELP NEEDED FOR BATHING: A LITTLE
DRESSING REGULAR LOWER BODY CLOTHING: A LOT
TOILETING: A LOT
DRESSING REGULAR UPPER BODY CLOTHING: A LITTLE
MOBILITY SCORE: 18
SUGGESTED CMS G CODE MODIFIER MOBILITY: CK
CLIMB 3 TO 5 STEPS WITH RAILING: A LITTLE
WALKING IN HOSPITAL ROOM: A LITTLE
MOVING FROM LYING ON BACK TO SITTING ON SIDE OF FLAT BED: A LITTLE
MOVING TO AND FROM BED TO CHAIR: A LITTLE
DAILY ACTIVITIY SCORE: 18
SUGGESTED CMS G CODE MODIFIER DAILY ACTIVITY: CK
STANDING UP FROM CHAIR USING ARMS: A LITTLE
TURNING FROM BACK TO SIDE WHILE IN FLAT BAD: A LITTLE

## 2023-12-03 ASSESSMENT — PAIN DESCRIPTION - PAIN TYPE
TYPE: ACUTE PAIN
TYPE: ACUTE PAIN
TYPE: ACUTE PAIN;SURGICAL PAIN
TYPE: ACUTE PAIN
TYPE: ACUTE PAIN;SURGICAL PAIN
TYPE: ACUTE PAIN

## 2023-12-03 ASSESSMENT — GAIT ASSESSMENTS
DISTANCE (FEET): 50
DEVIATION: BRADYKINETIC
GAIT LEVEL OF ASSIST: SUPERVISED
ASSISTIVE DEVICE: FRONT WHEEL WALKER

## 2023-12-03 ASSESSMENT — ACTIVITIES OF DAILY LIVING (ADL): TOILETING: INDEPENDENT

## 2023-12-03 NOTE — PROGRESS NOTES
[facility] Medicine Daily Progress Note    Date of Service  12/2/2023    Chief Complaint  Hoda Summers is a 23 y.o. female admitted 11/28/2023 with back pain    Hospital Course  Hoda Summers is a 23 y.o. female who presented 11/28/2023 with intractable back pain.  Patient states that she has had back pain since April 22 after the birth of her son.  However ever since this morning, she has had worsening back pain.  She states that she is unable to ambulate.  As result she presented to the emergency department.  She was given multiple doses of IV pain medication without any improvement.  MRI does show evidence of disc extrusion    Interval Problem Update    ANH ON  NPO after midnight for surgery this morning.  Her pain is about the same. Did not require IV dilaudid overnight.  Underwent L4 laminotomy with L4-L5 microdiscectomy by Dr. Gimenez today under general anesthesia.  Post-procedure she has more strength and mobility.  Pain significantly improved after IV toradol.  Denies bleeding, N/V, dyspnea.  CBC/CMP ordered for AM post-op.    I have discussed this patient's plan of care and discharge plan at IDT rounds today with Case Management, Nursing, Nursing leadership, and other members of the IDT team.    Consultants/Specialty  Ortho Spine surgery    Code Status  Full Code    Disposition  The patient is not medically cleared for discharge to home or a post-acute facility.  Anticipate discharge to: home with close outpatient follow-up    I have placed the appropriate orders for post-discharge needs.    Review of Systems  Review of Systems   Respiratory:  Negative for cough, hemoptysis and sputum production.    Cardiovascular:  Negative for chest pain, palpitations and orthopnea.   Gastrointestinal:  Negative for abdominal pain, blood in stool, constipation, diarrhea, melena, nausea and vomiting.   Musculoskeletal:  Positive for back pain. Negative for joint pain, myalgias and neck pain.   Skin:   Negative for itching and rash.   Neurological:  Positive for sensory change.   Psychiatric/Behavioral:  Negative for depression. The patient is nervous/anxious.    All other systems reviewed and are negative.       Physical Exam  Temp:  [36.1 °C (97 °F)-36.8 °C (98.2 °F)] 36.8 °C (98.2 °F)  Pulse:  [61-91] 79  Resp:  [12-20] 15  BP: (101-145)/(60-92) 145/89  SpO2:  [95 %-98 %] 96 %    Physical Exam  Vitals and nursing note reviewed. Exam conducted with a chaperone present (Ryley at bedside).   Constitutional:       General: She is not in acute distress.     Appearance: She is obese. She is not ill-appearing, toxic-appearing or diaphoretic.   HENT:      Head: Normocephalic and atraumatic.      Nose: Nose normal.      Mouth/Throat:      Mouth: Mucous membranes are dry.   Eyes:      General: No scleral icterus.     Conjunctiva/sclera: Conjunctivae normal.   Cardiovascular:      Rate and Rhythm: Normal rate and regular rhythm.      Pulses: Normal pulses.      Heart sounds: Normal heart sounds. No murmur heard.     No friction rub. No gallop.   Pulmonary:      Effort: Pulmonary effort is normal. No respiratory distress.      Breath sounds: Normal breath sounds. No wheezing, rhonchi or rales.   Abdominal:      General: Abdomen is protuberant. Bowel sounds are normal. There is no distension.      Palpations: Abdomen is soft.      Tenderness: There is no abdominal tenderness. There is no guarding or rebound.   Genitourinary:     Comments: No monahan  Musculoskeletal:      Cervical back: Neck supple.      Lumbar back: No edema, spasms, tenderness or bony tenderness. Positive left straight leg raise test. Negative right straight leg raise test.      Right lower leg: No edema.      Left lower leg: No edema.   Skin:     General: Skin is warm and dry.      Comments: Hemovac from incision +Sanguinous output   Neurological:      Mental Status: She is alert.      Motor: No weakness (5/5 BLE dorsiflexion and hip flexion).       Comments: Appropriately conversant   Psychiatric:         Attention and Perception: Attention and perception normal.         Mood and Affect: Mood and affect normal.         Speech: Speech normal.         Behavior: Behavior normal. Behavior is cooperative.         Thought Content: Thought content normal.         Cognition and Memory: Cognition and memory normal.         Judgment: Judgment normal.         Fluids    Intake/Output Summary (Last 24 hours) at 12/2/2023 1741  Last data filed at 12/2/2023 1300  Gross per 24 hour   Intake 1370 ml   Output 100 ml   Net 1270 ml       Laboratory                            Imaging  MR-LUMBAR SPINE-W/O   Final Result         Left paracentral disc extrusion at L4-5 that migrates slightly caudally and impinges upon the descending left L5 nerve.      Right paracentral disc protrusion at L5-S1 that impinges upon the right S1 nerve in the lateral recess.         DX-LUMBAR SPINE-2 OR 3 VIEWS   Final Result         1.  No acute traumatic bony injury of the lumbar spine.   2.  Probable facet arthrosis at L5/S1 resulting in neural foraminal stenosis.   3.  Mild anterior wedge deformity at T11, appearance favors chronic injury.      DX-PORTABLE FLUORO > 1 HOUR    (Results Pending)   DX-SPINE-ANY ONE VIEW    (Results Pending)        Assessment/Plan  * Intractable back pain- (present on admission)  Assessment & Plan  Multimodal pain management  NSAID: Scheduled ibuprofen 600 mg TID  Steroid: Dexamethasone 4 mg BID x7 days  APAP: Scheduled 1000 mg TID  Anti-neuropathic: Lyrica 25 mg TID (intolerant of gabapentin)  Anti-spasmodic: Flexeril 10 mg TID  Topical: Lidoderm patch q24h  Temperature: Warm pack  Opiate PRN: Oxycodone 5-10 mg q3h PRN + IV Hydromorphone PRN for breakthrough  PT/OT Eval and Treat    Bulging of intervertebral disc between L4 and L5- (present on admission)  Assessment & Plan  MRI demonstrates L4-L5 and L5-S1 extrusions with L4-L5 compressing Left descending nerve  Spinal  surgery consulted, underwent L4-L5 discectomy with microdissection by Dr. Gimenez 12/2  Post-procedure PT/OT 12/3    Severe obesity (HCC)- (present on admission)  Assessment & Plan  Suspected OHS/ASTON based on secondary polycythemia  Outpatient weight loss      VTE prophylaxis:   SCDs/TEDs   pharmacologic prophylaxis contraindicated due to spinal surgery today      I have performed a physical exam and reviewed and updated ROS and Plan today (12/2/2023). In review of yesterday's note (12/1/2023), there are no changes except as documented above.

## 2023-12-03 NOTE — DISCHARGE SUMMARY
Discharge Summary    CHIEF COMPLAINT ON ADMISSION  Chief Complaint   Patient presents with    Back Pain     Lower back pain radiating to left leg       Reason for Admission  Intractable back pain     Admission Date  11/28/2023    CODE STATUS  Full Code    HPI & HOSPITAL COURSE  Hoda Summers is a 23 y.o. female who presented 11/28/2023 with intractable back pain.      She has had back pain since April 22 after the birth of her son. It progressed to the point of inability to ambulate prompting her to seek care in the ED. Pain was refractory to IV dexamethasone and IV morphine prompting admission for intractable back pain. Physical exam was pertinent for Left straight leg raise and weakness with dorsiflexion/plantarflexion concerning for radiculopathy. She was treated with multimodal pain control of ibuprofen, lyrica, acetaminophen, dexamethasone, lidoderm, and warm compress for which she had breakthrough pain treated with oxycodone PRN and hydromorphone IV PRN. MRI L-spine demonstrated L4-L5 disc extrusion causing compression of the L5 nerve root. Spinal surgery was consulted and performed laminotomy with L4-L5 discectomy 12/2/23. Post-procedure she had significant improvement in LLE strength and resolution of SLR sign. She was evaluated by PT/OT post-operative and recommended for outpatient PT services, for which a referral was provided. She was not a candidate for HHPT due to Medicaid.    Therefore, she is discharged in fair and stable condition to home with close outpatient follow-up.    The patient met 2-midnight criteria for an inpatient stay at the time of discharge.    Discharge Date  12/3/2023    FOLLOW UP ITEMS POST DISCHARGE    Postoperative pain - provided 5 day course of opiates with scheduled anti-inflammatories and antispasmodics, afterwards will transition to PRN doses    Laminotomy - follow up with spinal surgery for postoperative care    DISCHARGE DIAGNOSES  Principal Problem (Resolved):     Intractable back pain (POA: Yes)  Active Problems:    Severe obesity (HCC) (POA: Yes)  Resolved Problems:    Bulging of intervertebral disc between L4 and L5 (POA: Yes)      FOLLOW UP  No future appointments.  Rocco Gimenez M.D.  [id number]  [address]  [place] [id number]  [phone]    Schedule an appointment as soon as possible for a visit  after-hospital surgery follow up      MEDICATIONS ON DISCHARGE     Medication List        START taking these medications        Instructions   albuterol 108 (90 Base) MCG/ACT Aers inhalation aerosol   Inhale 2 Puffs every four hours as needed for Shortness of Breath.  Dose: 2 Puff     cyclobenzaprine 10 mg Tabs  Start taking on: December 3, 2023  Commonly known as: Flexeril   Take 1 Tablet by mouth 3 times a day for 5 days, THEN 1 Tablet 3 times a day as needed for Muscle Spasms for up to 30 days.     dexamethasone 4 MG Tabs  Commonly known as: Decadron   Take 1 Tablet by mouth 2 times a day for 3 days.  Dose: 4 mg     ibuprofen 600 MG Tabs  Start taking on: December 3, 2023  Commonly known as: Motrin   Take 1 Tablet by mouth in the morning, at noon, and at bedtime for 5 days, THEN 1 Tablet every 6 hours as needed for Moderate Pain for up to 30 days.     Lidocaine Pain Relief 4 % Ptch  Start taking on: December 4, 2023  Generic drug: lidocaine   Place 2 Patches on the skin every 24 hours for 5 days. Place on low back on each side of the incision - do NOT place over incision  Dose: 2 Patch     oxyCODONE immediate-release 5 MG Tabs  Commonly known as: Roxicodone   Take 1-2 Tablets by mouth every four hours as needed for Severe Pain for up to 5 days.  Dose: 5-10 mg     pregabalin 25 MG Caps  Commonly known as: Lyrica   Take 1 Capsule by mouth 3 times a day for 30 days.  Dose: 25 mg            CHANGE how you take these medications        Instructions   acetaminophen 500 MG Tabs  What changed:   medication strength  how much to take  when to take this  reasons to take  this  Commonly known as: Tylenol   Take 2 Tablets by mouth in the morning, at noon, and at bedtime.  Dose: 1,000 mg              Allergies  Not on File    DIET  Orders Placed This Encounter   Procedures    Diet Order Diet: Regular     Standing Status:   Standing     Number of Occurrences:   1     Order Specific Question:   Diet:     Answer:   Regular [1]       ACTIVITY  As tolerated.  Weight bearing as tolerated    CONSULTATIONS  Spinal Surgery    PROCEDURES  ORTHO SPINE OPERATIVE REPORT  [facility] Spine [place]           Preoperative diagnosis:     1.  Large left-sided L4-L5 herniated nucleus pulposus  2.  Left-sided L4-L5 severe lateral recess stenosis  3.  Intractable left lower extremity radiculopathy consistent with L5  4.  Multilevel lumbar degenerative disc disease  5.  Chronic low back pain  6.  Morbid obesity BMI greater than 40        Postoperative diagnosis:     1.  Large left-sided L4-L5 herniated nucleus pulposus  2.  Left-sided L4-L5 severe lateral recess stenosis  3.  Intractable left lower extremity radiculopathy consistent with L5  4.  Multilevel lumbar degenerative disc disease  5.  Chronic low back pain  6.  Morbid obesity BMI greater than 40     Procedure:     1.  Left L4 laminotomy with left L4-L5 microdiscectomy  2.  Modifier 22 secondary to morbid obesity BMI greater than 40     Surgeon:        Rocco Gimenez MD     Assistant:    Jose CAMPOS       Anesthesia:      General via endotracheal tube     Intravenous fluids:       1000 cc lactated Ringer's     Estimated blood loss:      Less than 10 cc     Complications:       None     Indications:     Ms. Luciano is a 23-year-old female with a past medical history significant for morbid obesity with a BMI greater than 40 and asthma.     This patient presents to the [facility] emergency room with complaints of severe low back pain with radiation down the left lower extremity.     The patient reports that she had been  complaining of low back pain with pain down the left lower extremity since April.  She attributes the onset of this pain due to her pregnancy.     The patient reporting significant difficulty with activities of daily living and maintaining activities secondary to the pain.     She had previously presented to [facility] emergency room with similar complaints several weeks ago.     She was started on gabapentin.  She however she only took this for approximately 2 weeks because she could not tolerate how it made her feel.     Patient does have a history of vaping.     The patient lives with her boyfriend and her child.     Patient states that she is unable to get up and walk and ambulate normally.     She was not progressing in the hospital.  She was laying in bed most of the day.     We discussed her MRI findings.  MRI revealed a large left-sided L4-L5 herniated nucleus pulposus with impingement of the traversing L5 nerve root.     The patient's complaints of pain were in the left lower extremity down the lateral aspect of the lower extremity consistent with an L5 radiculopathy.     We discussed nonoperative treatment.  We discussed the risk, benefits the alternatives associate with operative treatment.  She voiced understanding.  She wished to proceed.  Signed consent was obtained.     We discussed realistic expectations.     We discussed the need to avoid use of narcotic pain medications and the risk associated with these.     Operative technique:     Patient patient was taken the operative suite.  General anesthesia was induced via an endotracheal tube.  Patient was positioned prone on Jose David table.  Bony prominences were well-padded using a crate foam.  Bilateral PAS boots were utilized and operational throughout the case.     Lumbar spine was cleansed thoroughly using rubbing alcohol.  The lumbar spine was cleansed thoroughly using Betadine paint.  A full Betadine scrub was then performed.  This followed by  DuraPrep and Ioban.     Timeout was called.  The patient did receive 3 g of Ancef given intravenously at the start of the case.     A spinal needle was placed at the anticipated level of the incision.  An intraoperative x-ray was obtained to confirm proper localization.     Based on our interpretation we were at the L4-L5 disc space.     A 10 blade scalpel was then utilized to create a midline skin incision approximately 2 cm in length.  Sharp dissection was carried down through the dermis.  Hemostasis controlled using Bovie cautery.  Subcutaneous tissues were dissected in line with skin incision down to the thoracolumbar fascia.  The thoracolumbar fascia was incised just left of midline at the tip of the spinous process of L4 and L5.  Subperiosteal dissection was carried down the lateral aspect of the spinous processes of L4 and L5.  Care was taken to maintain the integrity of the facet joint capsule.     A [place] retractor was placed.  Complete exposure of the L4 and L5 hemilamina on the left side was accomplished.     A second intraoperative x-ray was obtained to confirm we were at the proper levels.     Midas Fratnz bur with an AM 35 bit was then used to thin the caudal aspect of the L4 hemilamina and the cephalad aspect of the L5 hemilamina.  A very very slight facetectomy medially was performed.  We were very mindful in regards to her obesity and the risk of instability.     A angled 3 oh curette from the cromolyn instrumentation set was used to take down the hypertrophied ligamentum flavum from the ventral surface of the L4 hemilamina.  This allowed us to utilize a 4 mm Kerrison to perform a laminotomy of L4.  We did again take a very slight amount of the medial facet.  We then circled around and took the cephalad portion of the L5 hemilamina.     The traversing L5 nerve root was identified.  Hemostasis controlled using bipolar cautery and Gelfoam with thrombin.     The traversing L5 nerve root was mobilized  medially.  This revealed a large herniated nucleus pulposus fragment sitting within the lateral recess.  The remaining fibers of the annulus were pierced with a Penfield 4.  Nucleus pulposus material began to extrude itself through this annulotomy.     We enlarged the annulotomy with a 15 blade scalpel on the long handle.  We were able to pluck a large single fragment nucleus pulposus material.  This was saved.  We then probed the annulotomy site for a other loose fragments.  We could find none.     Hemostasis again controlled using bipolar cautery and Gelfoam with thrombin.     The wound was then irrigated with copious amounts normal saline.  The wound was irrigated with copious amounts normal saline mixed with Betadine.     A final x-ray with a Robles ball-tipped probe in place was obtained.     A medium Hemovac drain was then placed deep to the thoracolumbar fascia and juxtaposed next to the laminotomy defects.     The wound was then closed in layered fashion using 0 Vicryl 2-0 Vicryl and 3-0 Monocryl in a subcuticular fashion.     Patient tolerated procedure well.  There are no complications.  A sterile dressing consisting of 4 x 4's and Medipore tape was placed.     The patient was extubated without difficulty and taken to recovery room in stable condition.  She did receive a one-time dose of Toradol 30 mg given on in the recovery room.    LABORATORY  Lab Results   Component Value Date    SODIUM 138 12/03/2023    POTASSIUM 4.0 12/03/2023    CHLORIDE 104 12/03/2023    CO2 23 12/03/2023    GLUCOSE 104 (H) 12/03/2023    BUN 9 12/03/2023    CREATININE 0.66 12/03/2023        Lab Results   Component Value Date    WBC 13.3 (H) 12/03/2023    HEMOGLOBIN 14.2 12/03/2023    HEMATOCRIT 43.9 12/03/2023    PLATELETCT 325 12/03/2023        Total time of the discharge process exceeds 36 minutes.

## 2023-12-03 NOTE — PROGRESS NOTES
FWW fit to pt and left at door.    Contact traction for any questions or concerns regarding this DME.

## 2023-12-03 NOTE — CARE PLAN
The patient is Watcher - Medium risk of patient condition declining or worsening    Shift Goals  Clinical Goals: safety, comfort, pain control  Patient Goals: pain control, rest      Progress made toward(s) clinical / shift goals:  Pt kept safe and free from falls. Medicated for pain using PRN medications as ordered, see MAR, with adequate pain control.       Problem: Pain - Standard  Goal: Alleviation of pain or a reduction in pain to the patient’s comfort goal  Outcome: Progressing     Problem: Knowledge Deficit - Standard  Goal: Patient and family/care givers will demonstrate understanding of plan of care, disease process/condition, diagnostic tests and medications  Outcome: Progressing     Problem: Fall Risk  Goal: Patient will remain free from falls  Outcome: Progressing     Problem: Mobility  Goal: Patient's capacity to carry out activities will improve  Outcome: Progressing

## 2023-12-03 NOTE — PROGRESS NOTES
ORTHO SPINE Progress Note  [facility]        Date of Service: 12/3/2023    Chief Complaint  23 y.o. female admitted 2023 with intractable low back pain and intractable pain radiating down the left lower extremity.    The patient was taken the operating room yesterday.  She underwent a left-sided L4 laminotomy with a left-sided L4-L5 microdiscectomy.    The patient did well through surgery.  There were no complications.    Through the night the patient states she has been up and stood at the bedside.    I walked into the room this morning she is sound asleep laying flat on her back in her hospital bed.  Her boyfriend on the couch sleeping as well.  Lites are all off.    The patient woke up with movement of her toes bilaterally.    She states that she was able to stand at the bedside.    She has not walked out in the halls.  She has not walked in her room.  She is still using the bedside commode.!!!!!!!    Interval Problem Update  Unchanged    Consultants/Specialty  Hospitalist    Disposition  Good        ROS   Physical Exam  Laboratory/Imaging   Hemodynamics  Temp (24hrs), Av.5 °C (97.7 °F), Min:36.2 °C (97.1 °F), Max:37 °C (98.6 °F)   Temperature: 36.4 °C (97.6 °F)  Pulse  Av.1  Min: 52  Max: 91    Blood Pressure: 118/73      Respiratory      Respiration: 15, Pulse Oximetry: 98 %     Work Of Breathing / Effort: Within Normal Limits  RUL Breath Sounds: Diminished, RML Breath Sounds: Diminished, RLL Breath Sounds: Diminished, RUSS Breath Sounds: Diminished, LLL Breath Sounds: Diminished    Fluids    Intake/Output Summary (Last 24 hours) at 12/3/2023 0832  Last data filed at 12/3/2023 0442  Gross per 24 hour   Intake 1370 ml   Output 170 ml   Net 1200 ml       Nutrition  Orders Placed This Encounter   Procedures    Diet Order Diet: Regular     Standing Status:   Standing     Number of Occurrences:   1     Order Specific Question:   Diet:     Answer:   Regular [1]     Physical  Exam    General: Patient is a 23-year-old female who is sound asleep in her bed.    Mental status: Patient is alert and oriented x 4.    Lungs: Bilateral chest expansion.  She has no active cough.  She is not labored in her breathing.  Respiratory rate is normal.    Abdomen: Soft nontender nondistended.    Extremities:    Patient demonstrates good strength in lower extremities bilaterally.    Dressing is clean dry and intact.      Recent Labs     12/03/23  0006   WBC 13.3*   RBC 5.05   HEMOGLOBIN 14.2   HEMATOCRIT 43.9   MCV 86.9   MCH 28.1   MCHC 32.3   RDW 40.7   PLATELETCT 325   MPV 10.1     Recent Labs     12/03/23  0006   SODIUM 138   POTASSIUM 4.0   CHLORIDE 104   CO2 23   GLUCOSE 104*   BUN 9   CREATININE 0.66   CALCIUM 8.4*                      Assessment/Plan     No new Assessment & Plan notes have been filed under this hospital service since the last note was generated.  Service: Surgery Orthopedic      1.  L4 laminotomy with left-sided L4-L5 microdiscectomy POD#1  2.  Morbid obesity  3.  Lumbar degenerative disc disease    Plan:    DC all IV pain meds.    This patient's pain is way way way out of proportion to what 1 would expect.  She is not motivated.    Mobilize with physical therapy and Occupational Therapy.  In my opinion discharge home later this afternoon.    99% of patients after a discectomy would be walking in the halls.  Not using a bedside commode.    Pain medication should be nothing more than Tylenol with codeine 1 tablet every 4 hours as needed.    Follow-up in my office in 10 days.      Quality-Core Measures

## 2023-12-03 NOTE — THERAPY
Occupational Therapy   Initial Evaluation     Patient Name: Kristie Luciano  Age:  23 y.o., Sex:  female  Medical Record #: [id number]  Today's Date: 12/3/2023    Precautions: Fall Risk, Spinal / Back Precautions   Comments: no brace    Assessment  Patient is 23 y.o. female admitted with intractable low back pain pain radiating down the left lower extremity. Pt seen s/p left-sided L4 laminotomy with a left-sided L4-L5 microdiscectomy. Pt seen for OT eval and tx. Pt currently resides with her SO in a ground floor apartment and was independent with ADLs and IADLs.     During OT eval, pt primarily impacted by pain and self-limiting behaviors causing her to require max A to complete most ADLs and CGA to complete functional mobility and txfs with FWW. Pt expressed concerns regarding inability to complete clothing management and pericare, but was not receptive to education regarding loose fit clothing and AE options due to income status. Provided education on spinal precautions, AE/DME recommendations, home safety, and compensatory strategies to safely complete ADLs. Pt was not receptive to education and refused further information regarding Care Chest. Anticipate that pt will quickly progress while in house with effective pain management strategies. Will continue to follow in the acute setting for ongoing OT services.     Plan    Occupational Therapy Initial Treatment Plan   Treatment Interventions: Self Care / Activities of Daily Living, Therapeutic Exercises, Therapeutic Activity, Adaptive Equipment  Treatment Frequency: 3 Times per Week  Duration: Until Therapy Goals Met    DC Equipment Recommendations: Tub / Shower Seat, Toilet Aide, Other (Comments)  Discharge Recommendations: Anticipate that the patient will have no further occupational therapy needs after discharge from the hospital     Objective      Prior Living Situation   Prior Services Home-Independent   H[facility] [address] [facility]   Steps  Into Home 0   Steps In Home 0   Bathroom Set up Bathtub / Shower Combination   Equipment Owned None   Lives with - Patient's Self Care Capacity Significant Other   Comments Pt currently resides with her fiance who is able to provide assist as needed. Pt's sister also lives local and can assist as needed.   Prior Level of ADL Function   Self Feeding Independent   Grooming / Hygiene Independent   Bathing Independent   Dressing Independent   Toileting Independent   Prior Level of IADL Function   Medication Management Independent   Laundry Independent   Kitchen Mobility Independent   Finances Independent   Home Management Independent   Shopping Independent   Prior Level Of Mobility Independent Without Device in Community;Independent Without Device in Home   Driving / Transportation Driving Independent   Occupation (Pre-Hospital Vocational) Employed Full Time  ()   Precautions   Precautions Fall Risk;Spinal / Back Precautions    Comments No Brace   Vitals   O2 Delivery Device None - Room Air   Pain 0 - 10 Group   Therapist Pain Assessment Post Activity Pain Same as Prior to Activity;Nurse Notified  (Not rated, reported an increase in pain with movement.)   Cognition    Cognition / Consciousness WDL   Level of Consciousness Alert   Comments Pt required increased encouragement to participate in session. Stated that she had concerns regarding ability to complete ADLs, provided education regarding AE/DME to assist with independence but pt was not receptive to education. Demo self-limiting behaviors due to pain.   Active ROM Upper Body   Active ROM Upper Body  WDL   Dominant Hand Right   Strength Upper Body   Upper Body Strength  WDL   Balance Assessment   Sitting Balance (Static) Fair +   Sitting Balance (Dynamic) Fair +   Standing Balance (Static) Fair   Standing Balance (Dynamic) Fair   Weight Shift Sitting Fair   Weight Shift Standing Fair   Comments w/FWW   Bed Mobility    Supine to Sit Minimal Assist   Sit to  Supine   (Up to chair post)   Scooting Supervised   Rolling Minimal Assist to Rt.   Comments HOB slightly elevated, use of rails, log roll   ADL Assessment   Eating Modified Independent   Grooming Supervision;Seated   Lower Body Dressing Maximal Assist   Toileting Maximal Assist  (Required assist with pericare and clothing management after voiding on standard toilet)   6 Clicks Daily Activity Score 18   Functional Mobility   Sit to Stand Contact Guard Assist   Bed, Chair, Wheelchair Transfer Contact Guard Assist   Toilet Transfers Contact Guard Assist   Mobility Functional mobility in room w/FWW   Activity Tolerance   Sitting in Chair Up to chiar post   Sitting Edge of Bed 5 min   Standing 2 min   Patient / Family Goals   Patient / Family Goal #1 To go home   Short Term Goals   Short Term Goal # 1 Pt will complete LB dressing with supv using AE PRN   Short Term Goal # 2 Pt will complete ADL txfs with supv   Short Term Goal # 3 Pt will complete toileting with supv   Education Group   Education Provided Spinal Precautions;Home Safety;Role of Occupational Therapist;Activities of Daily Living;Adaptive Equipment   Role of Occupational Therapist Patient Response Patient;Significant Other;Acceptance;Explanation;Verbal Demonstration   Spinal Precautions Patient Response Patient;Significant Other;Nonacceptance;Explanation;Handout;Demonstration;Teaching Refused   Home Safety Patient Response Patient;Significant Other;Nonacceptance;Explanation;Teaching Refused  (Recommend showr chair and having supervision stepping in/out of shower to reduce risk of falling)   ADL Patient Response Patient;Significant Other;Nonacceptance;Explanation;Teaching Refused  (Compensatory strategies to safely complete ADLs)   Adaptive Equipment Patient Response Patient;Significant Other;Nonacceptance;Explanation;Teaching Refused  (AE/DME to assist with improving independence with toileting and safety with bathing)

## 2023-12-03 NOTE — DISCHARGE INSTRUCTIONS
Discharge Instructions per Jeffrey Lam M.D.    You were hospitalized for back pain due to a protruding disc (L4-L5) which improved with surgery (discectomy and laminotomy). Your mobility significantly improved after surgery.    DIET: Regular    ACTIVITY: Gradually return to regular activity    DIAGNOSIS: L4-L5 Disc Herniation    Return to ER if you develop loss of control of your bowels or bladder, develop uncontrolled pain even after taking meds, or faint for any reason.    Herniated Disk    A herniated disk happens when a disk in the spine bulges out too far. There is an oval disk between each pair of bones (vertebrae) in the backbone or spine. The disks connect the bones, help the spine move, and keep the bones from rubbing against each other when you move.  A herniated disk can happen anywhere in the back or neck area. It most often affects the lower back.  What are the causes?  This condition may be caused by:  Wear and tear as you age.  Sudden injury, such as a strain or sprain.  What increases the risk?  The following factors may make you more likely to develop this condition:  Age. The older you are the higher the risk.  Being a man who is 30-50 years old.  Doing activities that involve heavy lifting, bending, or twisting.  Not getting enough exercise.  Being overweight.  Smoking or using tobacco.  What are the signs or symptoms?  Symptoms may vary depending on where the herniated disk is in your body.  Sharp pain in the arm, hip, butt, or in the lower back. The pain can spread to the leg and foot.  Dizziness.  A feeling that things are moving when they are not (vertigo).  Pain or weakness in the neck, shoulder, upper or lower arm, or fingers.  Muscle weakness. You may not be able to:  Lift your arm or leg.  Stand on your toes.  Squeeze with your hands.  Loss of feeling (numbness) or tingling in the hands, arms, feet, or legs.  Being unable to control when to poop or pee. This is rare but serious.  How  is this treated?  This condition may be treated with:  Resting for a few days or several weeks.  Do not do things that need a lot of effort. Do not go into complete bed rest. Do only light activities.  If you have a herniated disk in your lower back, limit how much you sit. Sitting puts more pressure on the disk.  Medicines for pain, swelling, or tense muscles.  Ice or heat therapy.  Steroid shots. These can reduce pain and swelling.  Physical therapy to strengthen your back.  Follow these instructions at home:  Medicines  Take over-the-counter and prescription medicines only as told by your doctor.  If told, take steps to prevent problems with pooping (constipation). You may need to:  Drink enough fluid to keep your pee (urine) pale yellow.  Take over-the-counter or prescription medicines.  Eat foods that are high in fiber. These include beans, whole grains, and fresh fruits and vegetables.  Limit foods that are high in fat and processed sugars. These include fried or sweet foods.  Ask your doctor if you should avoid driving or using machines while you are taking your medicines.  Managing pain, stiffness, and swelling         If told, put ice on the affected area. To do this:  Put ice in a plastic bag.  Place a towel between your skin and the bag.  Leave the ice on for 20 minutes, 2-3 times a day.  If told, put heat on the painful area. Use the heat source that your doctor recommends, such as a moist heat pack or a heating pad.  Place a towel between your skin and the heat source.  Leave the heat on for 20-30 minutes.  Take off the heat or ice if your skin turns bright red. If you cannot feel pain, heat, or cold, you have a greater risk of getting burned.  Activity  Rest as told by your doctor. Avoid strict bed rest. Do only activities that do not cause pain.  After your rest period:  Return to your normal activities as told by your doctor. Slowly start doing exercises as told. Ask your doctor what activities and  exercises are safe for you.  Use good posture.  Avoid movements that cause pain.  Do not lift anything that is heavier than 10 lb (4.5 kg), or the limit that you are told.  Do not sit or stand for a long time without moving.  Do not sit for a long time without getting up and moving around.  If exercises (physical therapy) were prescribed, do them as told by your doctor.  Try to strengthen your back and belly with exercises such as swimming or walking.  General instructions  Do not smoke or use any products that contain nicotine or tobacco. If you need help quitting, ask your doctor.  Do not wear high-heeled shoes.  Do not sleep on your belly.  If you are overweight, work with your doctor to lose weight safely.  Keep all follow-up visits.  How is this prevented?  Stay at a healthy weight.  Stay in shape. Do at least 150 minutes of moderate-intensity exercise each week, such as fast walking or water aerobics.  When lifting objects:  Keep your feet as far apart as your shoulders or farther apart.  Tighten your belly muscles.  Bend your knees and hips, and keep your spine neutral. Lift using the strength of your legs, not your back. Do not lock your knees straight out.  Always ask for help to lift heavy or large objects.  Contact a doctor if:  You have back pain or neck pain that does not get better after 6 weeks.  You have very bad pain in your back, neck, legs, or arms.  You get any of these problems in any part of your body:  Tingling.  Weakness.  Loss of feeling.  Get help right away if:  You cannot move your arms or legs.  You cannot control when you pee or poop.  You feel dizzy.  You faint.  You have trouble breathing.  These symptoms may be an emergency. Get help right away. Call your local emergency services (911 in the [place]  Do not wait to see if the symptoms will go away.  Do not drive yourself to the hospital.  Summary  A herniated disk happens when a disk in your backbone bulges out too far.  This  condition may be caused by wear and tear as you age or a sudden injury.  Symptoms may vary depending on where your herniated disk is in your body.  Treatment may include rest, medicines, ice or heat therapy, steroid shots, and exercises.  This information is not intended to replace advice given to you by your health care provider. Make sure you discuss any questions you have with your health care provider.  Document Revised: 04/07/2021 Document Reviewed: 04/07/2021  [employer] Patient Education © 2023 [employer]    Laminectomy, Care After  The following information offers guidance on how to care for yourself after your procedure. Your health care provider may also give you more specific instructions. If you have problems or questions, contact your health care provider.  What can I expect after the procedure?  After the procedure, it is common to have:  Some pain around your incision area.  Muscle tightening (spasms) across the back.  Follow these instructions at home:  Medicines  Take over-the-counter and prescription medicines only as told by your health care provider.  If you were prescribed antibiotics, use them as told by your health care provider. Do not stop using the antibiotic even if you start to feel better.  If you are taking blood thinners:  Talk with your health care provider before you take any medicines that contain aspirin or NSAIDs, such as ibuprofen. These medicines increase your risk for dangerous bleeding.  Take your medicine exactly as told, at the same time every day.  Avoid activities that could cause injury or bruising, and follow instructions about how to prevent falls.  Wear a medical alert bracelet or carry a card that lists what medicines you take.  Ask your health care provider if the medicine prescribed to you:  Requires you to avoid driving or using machinery.  Can cause constipation. You may need to take these actions to prevent or treat constipation:  Drink enough fluid to keep your  urine pale yellow.  Take over-the-counter or prescription medicines.  Eat foods that are high in fiber, such as beans, whole grains, and fresh fruits and vegetables.  Limit foods that are high in fat and processed sugars, such as fried or sweet foods.  Do not drink alcohol if you are taking prescription pain medicine.  Incision care    Follow instructions from your health care provider about how to take care of your incision. Make sure you:  Wash your hands with soap and water for at least 20 seconds before and after you change your bandage (dressing). If soap and water are not available, use hand .  Change your dressing as told by your health care provider.  Leave stitches (sutures), skin glue, or adhesive strips in place. These skin closures may need to stay in place for 2 weeks or longer. If adhesive strip edges start to loosen and curl up, you may trim the loose edges. Do not remove adhesive strips completely unless your health care provider tells you to do that.  Check your incision area every day for signs of infection. Check for:  More redness, swelling, or pain.  Fluid or blood.  Warmth.  Pus or a bad smell.  Activity    Rest as told by your health care provider.  Avoid bending or twisting at your waist. Always bend at your knees.  Do not sit for a long time without moving. Get up to take short walks every 1-2 hours. This will improve blood flow and breathing. Ask for help if you feel weak or unsteady.  You may have to avoid lifting. Ask your health care provider how much you can safely lift.  Do exercises as told by your health care provider.  Return to your normal activities as told by your health care provider. Ask your health care provider what activities are safe for you.  General instructions    Do not use any products that contain nicotine or tobacco. These products include cigarettes, chewing tobacco, and vaping devices, such as e-cigarettes. These can delay bone healing after surgery. If  you need help quitting, ask your health care provider.  Do not take baths, swim, or use a hot tub until your health care provider approves. Ask your health care provider if you may take showers. You may only be allowed to take sponge baths.  Do not drive for 2 weeks after your procedure or for as long as told by your health care provider.  Wear compression stockings as told by your health care provider. These stockings help to prevent blood clots and reduce swelling in your legs.  Keep all follow-up visits. This is important so that your health care provider can monitor your healing and symptom improvement.  Contact a health care provider if:  You are not able to return to activities or do exercises as told by your health care provider.  You have any of these signs of infection:  Chills or a fever.  More redness, swelling, or pain around your incision.  Warmth at your incision area.  Fluid or blood coming from your incision.  Pus or a bad smell coming from your incision.  You develop a rash.  Get help right away if:  You feel dizzy or you faint while standing.  You develop shortness of breath or have difficulty breathing.  You cannot control when you urinate or have a bowel movement.  You become weak.  You are not able to use your legs.  These symptoms may be an emergency. Get help right away. Call 911.  Do not wait to see if the symptoms will go away.  Do not drive yourself to the hospital.  Summary  After the procedure, it is common to have some pain around your incision area or muscle tightening (spasms) across the back.  Follow instructions from your health care provider about how to care for your incision area.  You may have to avoid lifting. Ask your health care provider how much you can safely lift.  Contact your health care provider if you have signs of infection, such as more redness, swelling, or pain around your incision or if your incision feels warm to the touch.  This information is not intended to  replace advice given to you by your health care provider. Make sure you discuss any questions you have with your health care provider.  Document Revised: 03/29/2023 Document Reviewed: 03/29/2023  [employer] Patient Education © 2023 [employer]

## 2023-12-03 NOTE — THERAPY
"Physical Therapy   Initial Evaluation     Patient Name: Kristie Luciano  Age:  23 y.o., Sex:  female  Medical Record #: [id number]  Today's Date: 12/3/2023     Precautions  Precautions: Fall Risk;Spinal / Back Precautions   Comments: no brace    Assessment  23 y.o. female admitted 11/28/2023 with intractable low back pain and intractable pain radiating down the left lower extremity.  She underwent a left-sided L4 laminotomy with a left-sided L4-L5 microdiscectomy.  Patient presents to PT eval with pain and impaired mobility. She was able to ambulate with sup and FWW and will have assist from family as needed. She is resistant to education but was provided with a L/S handout. At this time recommend a FWW and OP PT at [place] for pain management and pelvic floor/abdominal PT.  No further acute care PT needs at this time.        Plan    Physical Therapy Initial Treatment Plan   Duration: Evaluation only    DC Equipment Recommendations: Front-Wheel Walker  Discharge Recommendations: Recommend outpatient physical therapy services to address higher level deficits       Subjective    \"I'm having a horrible experience at this hospital\"       Objective       12/03/23 1100   Precautions   Precautions Fall Risk;Spinal / Back Precautions    Comments no brace   Pain 0 - 10 Group   Location Back;Hip   Location Orientation Mid;Left   Therapist Pain Assessment 5;Post Activity Pain Same as Prior to Activity   Prior Living Situation   Prior Services Home-Independent   Housing / Facility 1 Story Apartment / Condo   Steps Into Home 0   Steps In Home 0   Bathroom Set up Bathtub / Shower Combination   Equipment Owned None   Lives with - Patient's Self Care Capacity Significant Other   Comments lives with her fiance   Prior Level of Functional Mobility   Bed Mobility Independent   Transfer Status Independent   Ambulation Independent   Assistive Devices Used None   Stairs Independent   Comments workas as a    History of Falls "   History of Falls No   Cognition    Cognition / Consciousness WDL   Level of Consciousness Alert   Comments Patient is cooperative with encouragement. she demos pain limiting behaviors   Active ROM Upper Body   Active ROM Upper Body  WDL   Strength Upper Body   Upper Body Strength  WDL   Active ROM Lower Body    Active ROM Lower Body  WDL   Strength Lower Body   Lower Body Strength  WDL   Sensation Lower Body   Lower Extremity Sensation   WDL   Other Treatments   Other Treatments Provided educated about pathologies of bed rest and pain control teechniques   Balance Assessment   Sitting Balance (Static) Fair +   Sitting Balance (Dynamic) Fair +   Standing Balance (Static) Fair   Standing Balance (Dynamic) Fair   Weight Shift Sitting Fair   Weight Shift Standing Fair   Comments w/FWW   Bed Mobility    Supine to Sit Minimal Assist   Scooting Supervised   Rolling Minimal Assist to Rt.   Comments cueing for log roll sequencing   Gait Analysis   Gait Level Of Assist Supervised   Assistive Device Front Wheel Walker   Distance (Feet) 50   # of Times Distance was Traveled 2   Deviation Bradykinetic   Functional Mobility   Sit to Stand Contact Guard Assist   Bed, Chair, Wheelchair Transfer Contact Guard Assist   Toilet Transfers Contact Guard Assist   How much difficulty does the patient currently have...   Turning over in bed (including adjusting bedclothes, sheets and blankets)? 3   Sitting down on and standing up from a chair with arms (e.g., wheelchair, bedside commode, etc.) 3   Moving from lying on back to sitting on the side of the bed? 3   How much help from another person does the patient currently need...   Moving to and from a bed to a chair (including a wheelchair)? 3   Need to walk in a hospital room? 3   Climbing 3-5 steps with a railing? 3   6 clicks Mobility Score 18   Activity Tolerance   Sitting in Chair post session   Sitting Edge of Bed 5 min   Standing 2 min   Comments self-limiting behavior   Edema /  Skin Assessment   Comments hemovac x1   Education Group   Education Provided Role of Physical Therapist;Gait Training;Use of Assistive Device;Spine Precautions   Spine Precautions Patient Response Patient;Significant Other;Acceptance;Explanation;Demonstration;Verbal Demonstration   Role of Physical Therapist Patient Response Patient;Acceptance;Explanation;Demonstration;Verbal Demonstration;Action Demonstration   Gait Training Patient Response Patient;Acceptance;Explanation;Demonstration;Verbal Demonstration;Action Demonstration   Use of Assistive Device Patient Response Patient;Acceptance;Explanation;Demonstration;Verbal Demonstration;Action Demonstration   Physical Therapy Initial Treatment Plan    Duration Evaluation only   Anticipated Discharge Equipment and Recommendations   DC Equipment Recommendations Front-Wheel Walker   Discharge Recommendations Recommend outpatient physical therapy services to address higher level deficits     Keyanna Downs, PT, DPT, [facility]

## 2023-12-04 NOTE — PROGRESS NOTES
Pt discharged to home, PIV removed, discharge instructions provided, hemovac removed, PIV removed, [id number] with pt.

## 2023-12-08 ENCOUNTER — PHARMACY VISIT (OUTPATIENT)
Dept: PHARMACY | Facility: MEDICAL CENTER | Age: 23
End: 2023-12-08
Payer: COMMERCIAL

## 2024-01-03 ENCOUNTER — APPOINTMENT (OUTPATIENT)
Dept: PHYSICAL THERAPY | Facility: REHABILITATION | Age: 24
End: 2024-01-03
Attending: STUDENT IN AN ORGANIZED HEALTH CARE EDUCATION/TRAINING PROGRAM
Payer: COMMERCIAL

## 2024-02-09 ENCOUNTER — APPOINTMENT (OUTPATIENT)
Dept: PHYSICAL THERAPY | Facility: REHABILITATION | Age: 24
End: 2024-02-09
Attending: STUDENT IN AN ORGANIZED HEALTH CARE EDUCATION/TRAINING PROGRAM
Payer: COMMERCIAL

## 2024-02-20 ENCOUNTER — APPOINTMENT (OUTPATIENT)
Dept: PHYSICAL THERAPY | Facility: REHABILITATION | Age: 24
End: 2024-02-20
Attending: STUDENT IN AN ORGANIZED HEALTH CARE EDUCATION/TRAINING PROGRAM
Payer: COMMERCIAL

## 2024-02-23 ENCOUNTER — APPOINTMENT (OUTPATIENT)
Dept: PHYSICAL THERAPY | Facility: REHABILITATION | Age: 24
End: 2024-02-23
Attending: STUDENT IN AN ORGANIZED HEALTH CARE EDUCATION/TRAINING PROGRAM
Payer: COMMERCIAL

## 2024-02-27 ENCOUNTER — APPOINTMENT (OUTPATIENT)
Dept: PHYSICAL THERAPY | Facility: REHABILITATION | Age: 24
End: 2024-02-27
Attending: STUDENT IN AN ORGANIZED HEALTH CARE EDUCATION/TRAINING PROGRAM
Payer: COMMERCIAL

## 2024-02-28 ENCOUNTER — APPOINTMENT (OUTPATIENT)
Dept: RADIOLOGY | Facility: MEDICAL CENTER | Age: 24
End: 2024-02-28
Attending: EMERGENCY MEDICINE
Payer: COMMERCIAL

## 2024-02-28 ENCOUNTER — PHARMACY VISIT (OUTPATIENT)
Dept: PHARMACY | Facility: MEDICAL CENTER | Age: 24
End: 2024-02-28
Payer: COMMERCIAL

## 2024-02-28 ENCOUNTER — HOSPITAL ENCOUNTER (EMERGENCY)
Facility: MEDICAL CENTER | Age: 24
End: 2024-02-28
Attending: EMERGENCY MEDICINE
Payer: COMMERCIAL

## 2024-02-28 VITALS
RESPIRATION RATE: 18 BRPM | HEART RATE: 90 BPM | BODY MASS INDEX: 34.72 KG/M2 | HEIGHT: 68 IN | DIASTOLIC BLOOD PRESSURE: 85 MMHG | OXYGEN SATURATION: 97 % | TEMPERATURE: 97.8 F | WEIGHT: 229.06 LBS | SYSTOLIC BLOOD PRESSURE: 120 MMHG

## 2024-02-28 DIAGNOSIS — J45.901 ASTHMA WITH ACUTE EXACERBATION, UNSPECIFIED ASTHMA SEVERITY, UNSPECIFIED WHETHER PERSISTENT: ICD-10-CM

## 2024-02-28 DIAGNOSIS — Z3A.08 8 WEEKS GESTATION OF PREGNANCY: ICD-10-CM

## 2024-02-28 LAB
ANION GAP SERPL CALC-SCNC: 13 MMOL/L (ref 7–16)
BASOPHILS # BLD AUTO: 0.5 % (ref 0–1.8)
BASOPHILS # BLD: 0.05 K/UL (ref 0–0.12)
BUN SERPL-MCNC: 5 MG/DL (ref 8–22)
CALCIUM SERPL-MCNC: 9.1 MG/DL (ref 8.5–10.5)
CHLORIDE SERPL-SCNC: 104 MMOL/L (ref 96–112)
CO2 SERPL-SCNC: 19 MMOL/L (ref 20–33)
CREAT SERPL-MCNC: 0.43 MG/DL (ref 0.5–1.4)
EKG IMPRESSION: NORMAL
EOSINOPHIL # BLD AUTO: 1.04 K/UL (ref 0–0.51)
EOSINOPHIL NFR BLD: 9.6 % (ref 0–6.9)
ERYTHROCYTE [DISTWIDTH] IN BLOOD BY AUTOMATED COUNT: 40.8 FL (ref 35.9–50)
FLUAV RNA SPEC QL NAA+PROBE: NEGATIVE
FLUBV RNA SPEC QL NAA+PROBE: NEGATIVE
GFR SERPLBLD CREATININE-BSD FMLA CKD-EPI: 140 ML/MIN/1.73 M 2
GLUCOSE SERPL-MCNC: 91 MG/DL (ref 65–99)
HCG SERPL QL: POSITIVE
HCT VFR BLD AUTO: 47.8 % (ref 37–47)
HGB BLD-MCNC: 15.9 G/DL (ref 12–16)
IMM GRANULOCYTES # BLD AUTO: 0.05 K/UL (ref 0–0.11)
IMM GRANULOCYTES NFR BLD AUTO: 0.5 % (ref 0–0.9)
LYMPHOCYTES # BLD AUTO: 2.26 K/UL (ref 1–4.8)
LYMPHOCYTES NFR BLD: 20.9 % (ref 22–41)
MCH RBC QN AUTO: 28.7 PG (ref 27–33)
MCHC RBC AUTO-ENTMCNC: 33.3 G/DL (ref 32.2–35.5)
MCV RBC AUTO: 86.3 FL (ref 81.4–97.8)
MONOCYTES # BLD AUTO: 0.53 K/UL (ref 0–0.85)
MONOCYTES NFR BLD AUTO: 4.9 % (ref 0–13.4)
NEUTROPHILS # BLD AUTO: 6.89 K/UL (ref 1.82–7.42)
NEUTROPHILS NFR BLD: 63.6 % (ref 44–72)
NRBC # BLD AUTO: 0 K/UL
NRBC BLD-RTO: 0 /100 WBC (ref 0–0.2)
PLATELET # BLD AUTO: 320 K/UL (ref 164–446)
PMV BLD AUTO: 10 FL (ref 9–12.9)
POTASSIUM SERPL-SCNC: 4 MMOL/L (ref 3.6–5.5)
RBC # BLD AUTO: 5.54 M/UL (ref 4.2–5.4)
RSV RNA SPEC QL NAA+PROBE: NEGATIVE
SARS-COV-2 RNA RESP QL NAA+PROBE: NOTDETECTED
SODIUM SERPL-SCNC: 136 MMOL/L (ref 135–145)
WBC # BLD AUTO: 10.8 K/UL (ref 4.8–10.8)

## 2024-02-28 PROCEDURE — 93005 ELECTROCARDIOGRAM TRACING: CPT | Performed by: EMERGENCY MEDICINE

## 2024-02-28 PROCEDURE — 93005 ELECTROCARDIOGRAM TRACING: CPT

## 2024-02-28 PROCEDURE — 76801 OB US < 14 WKS SINGLE FETUS: CPT

## 2024-02-28 PROCEDURE — 80048 BASIC METABOLIC PNL TOTAL CA: CPT

## 2024-02-28 PROCEDURE — 36415 COLL VENOUS BLD VENIPUNCTURE: CPT

## 2024-02-28 PROCEDURE — 84703 CHORIONIC GONADOTROPIN ASSAY: CPT

## 2024-02-28 PROCEDURE — 700101 HCHG RX REV CODE 250: Mod: UD | Performed by: EMERGENCY MEDICINE

## 2024-02-28 PROCEDURE — 71045 X-RAY EXAM CHEST 1 VIEW: CPT

## 2024-02-28 PROCEDURE — 94640 AIRWAY INHALATION TREATMENT: CPT

## 2024-02-28 PROCEDURE — 96375 TX/PRO/DX INJ NEW DRUG ADDON: CPT

## 2024-02-28 PROCEDURE — 700105 HCHG RX REV CODE 258: Mod: UD | Performed by: EMERGENCY MEDICINE

## 2024-02-28 PROCEDURE — RXMED WILLOW AMBULATORY MEDICATION CHARGE: Performed by: EMERGENCY MEDICINE

## 2024-02-28 PROCEDURE — 99285 EMERGENCY DEPT VISIT HI MDM: CPT

## 2024-02-28 PROCEDURE — 96365 THER/PROPH/DIAG IV INF INIT: CPT

## 2024-02-28 PROCEDURE — 85025 COMPLETE CBC W/AUTO DIFF WBC: CPT

## 2024-02-28 PROCEDURE — 0241U HCHG SARS-COV-2 COVID-19 NFCT DS RESP RNA 4 TRGT ED POC: CPT

## 2024-02-28 PROCEDURE — 700111 HCHG RX REV CODE 636 W/ 250 OVERRIDE (IP): Mod: JZ,UD | Performed by: EMERGENCY MEDICINE

## 2024-02-28 RX ORDER — IPRATROPIUM BROMIDE AND ALBUTEROL SULFATE 2.5; .5 MG/3ML; MG/3ML
3 SOLUTION RESPIRATORY (INHALATION) ONCE
Status: COMPLETED | OUTPATIENT
Start: 2024-02-28 | End: 2024-02-28

## 2024-02-28 RX ORDER — ALBUTEROL SULFATE 90 UG/1
2 AEROSOL, METERED RESPIRATORY (INHALATION) EVERY 6 HOURS PRN
Qty: 8.5 G | Refills: 0 | Status: SHIPPED | OUTPATIENT
Start: 2024-02-28

## 2024-02-28 RX ORDER — PREDNISONE 20 MG/1
60 TABLET ORAL ONCE
Status: DISCONTINUED | OUTPATIENT
Start: 2024-02-28 | End: 2024-02-28

## 2024-02-28 RX ORDER — SODIUM CHLORIDE, SODIUM LACTATE, POTASSIUM CHLORIDE, AND CALCIUM CHLORIDE .6; .31; .03; .02 G/100ML; G/100ML; G/100ML; G/100ML
30 INJECTION, SOLUTION INTRAVENOUS ONCE
Status: COMPLETED | OUTPATIENT
Start: 2024-02-28 | End: 2024-02-28

## 2024-02-28 RX ORDER — ONDANSETRON 2 MG/ML
4 INJECTION INTRAMUSCULAR; INTRAVENOUS ONCE
Status: COMPLETED | OUTPATIENT
Start: 2024-02-28 | End: 2024-02-28

## 2024-02-28 RX ORDER — MAGNESIUM SULFATE 1 G/100ML
1 INJECTION INTRAVENOUS ONCE
Status: COMPLETED | OUTPATIENT
Start: 2024-02-28 | End: 2024-02-28

## 2024-02-28 RX ADMIN — SODIUM CHLORIDE, POTASSIUM CHLORIDE, SODIUM LACTATE AND CALCIUM CHLORIDE 1917 ML: 600; 310; 30; 20 INJECTION, SOLUTION INTRAVENOUS at 09:32

## 2024-02-28 RX ADMIN — IPRATROPIUM BROMIDE AND ALBUTEROL SULFATE 3 ML: 2.5; .5 SOLUTION RESPIRATORY (INHALATION) at 09:59

## 2024-02-28 RX ADMIN — ONDANSETRON 4 MG: 2 INJECTION INTRAMUSCULAR; INTRAVENOUS at 10:03

## 2024-02-28 RX ADMIN — MAGNESIUM SULFATE IN DEXTROSE 1 G: 10 INJECTION, SOLUTION INTRAVENOUS at 10:30

## 2024-02-28 ASSESSMENT — FIBROSIS 4 INDEX: FIB4 SCORE: 0.3

## 2024-02-28 NOTE — DISCHARGE INSTRUCTIONS
You were seen in the ER for shortness of breath which is likely due to your history of asthma.  You received a breathing treatment as well as IV fluids and magnesium here today.  Thankfully your labs and imaging are reassuring, your ultrasound showed a single living intrauterine pregnancy at 8 weeks and 1 day.  We discussed the use of oral steroids in pregnancy and together have decided that we will first try inhaled steroids given possible risks to your developing baby.  I have called in a prescription for Pulmicort as well as an albuterol inhaler.  It will be important that you follow-up with your OB/GYN and/or primary care provider as you may need further assistance with your medication and/or symptoms.  Make sure to drink at least 8 ounces of clear liquids every hour to maintain your hydration.  Return immediately with new or worsening symptoms.  I hope you feel better soon!

## 2024-02-28 NOTE — ED PROVIDER NOTES
"ED Provider Note    CHIEF COMPLAINT  Chief Complaint   Patient presents with    Nasal Congestion    Cough    Shortness of Breath     Pt reports increased chest \"tightness\" and SOB, worse when laying flat x3 weeks. Hx of asthma.       EXTERNAL RECORDS REVIEWED  Inpatient Notes patient underwent spinal surgery/laminotomy with L4-L5 discectomy 12/2/2023 due to radiculopathy and weakness in the left lower extremity.    HPI/ROS  LIMITATION TO HISTORY   Select: : None  OUTSIDE HISTORIAN(S):  None    Kristie Tannerbonny Luciano is a 24 y.o. G2, P0 female at 10 weeks gestation based on LMP who presents with nasal congestion, nonproductive cough, chest tightness, and shortness of breath that she states is worse when lying flat.  Her symptoms have been ongoing and worsening over the past 3 weeks.  She has a history of asthma and has been using her inhaler without improvement in her symptoms.  She denies any fevers, chest pain, or vomiting but has had some mild nausea with her pregnancy.  She denies any vaginal bleeding or loss of fluid per vagina but states she has some intermittent abdominal pain.  She has initially established with an OB/GYN at [facility] and her OB/GYN instructed her to come to the ER for evaluation.  She denies any leg swelling, hemoptysis, or history of DVT/PE.    PAST MEDICAL HISTORY   has a past medical history of Asthma.    SURGICAL HISTORY   has a past surgical history that includes lumbar laminectomy diskectomy (Left, 12/2/2023).    FAMILY HISTORY  History reviewed. No pertinent family history.    SOCIAL HISTORY  Social History     Tobacco Use    Smoking status: Former     Types: Cigarettes    Smokeless tobacco: Not on file    Tobacco comments:     Vap tobacco products    Vaping Use    Vaping Use: Not on file   Substance and Sexual Activity    Alcohol use: Not Currently    Drug use: Not Currently     Types: Marijuana    Sexual activity: Not on file       CURRENT " "MEDICATIONS  Home Medications       Reviewed by Chelita Loza R.N. (Registered Nurse) on 02/28/24 at 0839  Med List Status: Not Addressed     Medication Last Dose Status   acetaminophen (TYLENOL) 500 MG Tab  Active   albuterol 108 (90 Base) MCG/ACT Aero Soln inhalation aerosol  Active   cephALEXin (KEFLEX) 500 MG Cap  Active                    ALLERGIES  Allergies   Allergen Reactions    Nsaids Hives       PHYSICAL EXAM  VITAL SIGNS: /77   Pulse (!) 103   Temp 36.6 °C (97.8 °F) (Temporal)   Resp 18   Ht 1.727 m (5' 8\")   Wt 104 kg (229 lb 0.9 oz)   LMP 11/27/2023 (Exact Date) Comment: new HCG 12/2 returned negative  SpO2 94%   BMI 34.83 kg/m²    Physical Exam  Vitals and nursing note reviewed.   Constitutional:       Appearance: She is well-developed.   HENT:      Head: Normocephalic and atraumatic.      Mouth/Throat:      Comments: Dry mucous membranes  Eyes:      Extraocular Movements: Extraocular movements intact.      Pupils: Pupils are equal, round, and reactive to light.   Cardiovascular:      Rate and Rhythm: Normal rate and regular rhythm.      Pulses: Normal pulses.      Heart sounds: Normal heart sounds.   Pulmonary:      Effort: Pulmonary effort is normal.      Comments: Significant and diffuse wheezes bilaterally with rhonchi throughout.  No respiratory distress.  No tachypnea, supraclavicular or substernal retractions, stridor.  Abdominal:      Palpations: Abdomen is soft.      Tenderness: There is no abdominal tenderness.   Musculoskeletal:         General: Normal range of motion.      Cervical back: Normal range of motion and neck supple.      Right lower leg: No edema.      Left lower leg: No edema.   Skin:     General: Skin is warm and dry.   Neurological:      General: No focal deficit present.      Mental Status: She is alert and oriented to person, place, and time.   Psychiatric:         Mood and Affect: Mood normal.         Behavior: Behavior normal.       DIAGNOSTIC STUDIES / " PROCEDURES  LABS  Results for orders placed or performed during the hospital encounter of 24   CBC with Differential   Result Value Ref Range    WBC 10.8 4.8 - 10.8 K/uL    RBC 5.54 (H) 4.20 - 5.40 M/uL    Hemoglobin 15.9 12.0 - 16.0 g/dL    Hematocrit 47.8 (H) 37.0 - 47.0 %    MCV 86.3 81.4 - 97.8 fL    MCH 28.7 27.0 - 33.0 pg    MCHC 33.3 32.2 - 35.5 g/dL    RDW 40.8 35.9 - 50.0 fL    Platelet Count 320 164 - 446 K/uL    MPV 10.0 9.0 - 12.9 fL    Neutrophils-Polys 63.60 44.00 - 72.00 %    Lymphocytes 20.90 (L) 22.00 - 41.00 %    Monocytes 4.90 0.00 - 13.40 %    Eosinophils 9.60 (H) 0.00 - 6.90 %    Basophils 0.50 0.00 - 1.80 %    Immature Granulocytes 0.50 0.00 - 0.90 %    Nucleated RBC 0.00 0.00 - 0.20 /100 WBC    Neutrophils (Absolute) 6.89 1.82 - 7.42 K/uL    Lymphs (Absolute) 2.26 1.00 - 4.80 K/uL    Monos (Absolute) 0.53 0.00 - 0.85 K/uL    Eos (Absolute) 1.04 (H) 0.00 - 0.51 K/uL    Baso (Absolute) 0.05 0.00 - 0.12 K/uL    Immature Granulocytes (abs) 0.05 0.00 - 0.11 K/uL    NRBC (Absolute) 0.00 K/uL   Basic Metabolic Panel   Result Value Ref Range    Sodium 136 135 - 145 mmol/L    Potassium 4.0 3.6 - 5.5 mmol/L    Chloride 104 96 - 112 mmol/L    Co2 19 (L) 20 - 33 mmol/L    Glucose 91 65 - 99 mg/dL    Bun 5 (L) 8 - 22 mg/dL    Creatinine 0.43 (L) 0.50 - 1.40 mg/dL    Calcium 9.1 8.5 - 10.5 mg/dL    Anion Gap 13.0 7.0 - 16.0   ESTIMATED GFR   Result Value Ref Range    GFR (CKD-EPI) 140 >60 mL/min/1.73 m 2   BETA-HCG QUALITATIVE SERUM   Result Value Ref Range    Beta-Hcg Qualitative Serum Positive (A) Negative   EKG   Result Value Ref Range    Report       [facility] Emergency Dept.    Test Date:  2024  Pt Name:    ANUSHA JHA    Department: ER  MRN:        [id number]                      Room:  Gender:     Female                       Technician: [id number]  :        2000                   Requested By:ER TRIAGE PROTOCOL  Order #:    [id number]                     Reading MD:    Measurements  Intervals                                Axis  Rate:       93                           P:          37  MI:         126                          QRS:        78  QRSD:       91                           T:          6  QT:         360  QTc:        448    Interpretive Statements  Sinus rhythm  RSR' in V1 or V2, probably normal variant  No previous ECG available for comparison     POC CoV-2, FLU A/B, RSV by PCR   Result Value Ref Range    POC Influenza A RNA, PCR Negative Negative    POC Influenza B RNA, PCR Negative Negative    POC RSV, by PCR Negative Negative    POC SARS-CoV-2, PCR NotDetected      RADIOLOGY  I have independently interpreted the diagnostic imaging associated with this visit and am waiting the final reading from the radiologist.   My preliminary interpretation is as follows: No lobar pneumonia  Radiologist interpretation:   US-OB 1ST TRIMESTER WITH TRANSVAGINAL (COMBO)   Final Result      1.  Single living intrauterine pregnancy at 8 weeks, 1 days estimated gestational age with an estimated date of delivery of 10/8/2024.   2.  No acute findings.      DX-CHEST-PORTABLE (1 VIEW)   Final Result      No acute cardiopulmonary disease evident.        COURSE & MEDICAL DECISION MAKING    ED Observation Status? No; Patient does not meet criteria for ED Observation.     INITIAL ASSESSMENT, COURSE AND PLAN  Care Narrative: This is a 24-year-old G2, P0 female with a history of asthma at approximately 10 weeks gestation based on LMP who is here with shortness of breath and cough.    Differential diagnosis includes, but is not limited to, asthma exacerbation, pneumothorax, viral syndrome, pneumonia, less likely PE.    Arrives to triage slightly tachycardic but afebrile with otherwise normal vital signs.  Upon my evaluation her heart rate is in the low 90s.  Her O2 sats are in the high 90s on room air and she is breathing easily without any distress however she has diffuse wheezes and  rhonchorous breath sounds bilaterally.  Abdomen is soft and nontender but she does report some intermittent pain in the abdomen and with her pregnancy we will obtain an ultrasound.  There is been no vaginal bleeding or loss of fluid per vagina.    Patient was given a DuoNeb as well as a dose of magnesium through the IV.  Chest x-ray was obtained which thankfully did not reveal acute abnormality and viral swabs were negative.  CBC without leukocytosis.  Metabolic panel demonstrates a very mildly low CO2 to 19 with otherwise normal electrolytes and renal function.    Transvaginal ultrasound consistent with IUP at 8 weeks 1 day and otherwise there were no acute findings.    Patient was reevaluated at bedside.  She notes that her symptoms are significantly improved although not completely resolved.  Heart rate in the high 80s low 90s and O2 sats are in the high 90s on room air.  We discussed risks and benefits of systemic steroids in pregnancy and together decided that we would trial budesonide and albuterol inhalers at home.  I recommended she follow-up closely with her primary care physician and her OB/GYN.  We went over strict return precautions together.  She was discharged in good and stable condition with strict return precautions    HYDRATION: Based on the patient's presentation of Dehydration the patient was given IV fluids. IV Hydration was used because oral hydration was not adequate alone. Upon recheck following hydration, the patient was improved.    ADDITIONAL PROBLEM LIST  None  DISPOSITION AND DISCUSSIONS  I have discussed management of the patient with the following physicians and MIKEY's: None    Discussion of management with other QHP or appropriate source(s): None     Escalation of care considered, and ultimately not performed: N/A.    Barriers to care at this time, including but not limited to: Patient does not have established PCP.     Decision tools and prescription drugs considered including, but  not limited to:  Pulmicort and albuterol inhalers. .    FINAL DIAGNOSIS  1. Asthma with acute exacerbation, unspecified asthma severity, unspecified whether persistent    2. 8 weeks gestation of pregnancy      Electronically signed by: Dale Sharif M.D., 2/28/2024 8:57 AM

## 2024-02-28 NOTE — ED NOTES
Discharge instructions given to pt including follow up with pcp or returning if no improvement of symptoms or to return if worse. Prescription x 2 provided to pt. E-sent to pharmacy. Questions answered by RN. Denies any new complaints. Discharged w/stable vitals and able to ambulate to the lobby with steady gait, pt verbalized relief of breathing. States that she is feeling better.

## 2024-02-28 NOTE — ED TRIAGE NOTES
"Chief Complaint   Patient presents with    Nasal Congestion    Cough    Shortness of Breath     Pt reports increased chest \"tightness\" and SOB, worse when laying flat x3 weeks. Hx of asthma.     /77   Pulse (!) 103   Temp 36.6 °C (97.8 °F) (Temporal)   Resp 18   Ht 1.727 m (5' 8\")   Wt 104 kg (229 lb 0.9 oz)   LMP 11/27/2023 (Exact Date) Comment: new HCG 12/2 returned negative  SpO2 94%   BMI 34.83 kg/m²     Pt ambulated into triage. EKG completed. Educated on triage process. Instructed to notify staff for any worsening symptoms.  "

## 2024-03-01 ENCOUNTER — APPOINTMENT (OUTPATIENT)
Dept: PHYSICAL THERAPY | Facility: REHABILITATION | Age: 24
End: 2024-03-01
Attending: STUDENT IN AN ORGANIZED HEALTH CARE EDUCATION/TRAINING PROGRAM
Payer: COMMERCIAL

## 2024-03-05 ENCOUNTER — APPOINTMENT (OUTPATIENT)
Dept: PHYSICAL THERAPY | Facility: REHABILITATION | Age: 24
End: 2024-03-05
Attending: STUDENT IN AN ORGANIZED HEALTH CARE EDUCATION/TRAINING PROGRAM
Payer: COMMERCIAL

## 2024-03-07 ENCOUNTER — APPOINTMENT (OUTPATIENT)
Dept: RADIOLOGY | Facility: MEDICAL CENTER | Age: 24
End: 2024-03-07
Attending: OBSTETRICS & GYNECOLOGY
Payer: COMMERCIAL

## 2024-03-08 ENCOUNTER — APPOINTMENT (OUTPATIENT)
Dept: PHYSICAL THERAPY | Facility: REHABILITATION | Age: 24
End: 2024-03-08
Attending: STUDENT IN AN ORGANIZED HEALTH CARE EDUCATION/TRAINING PROGRAM
Payer: COMMERCIAL

## 2024-03-12 ENCOUNTER — APPOINTMENT (OUTPATIENT)
Dept: PHYSICAL THERAPY | Facility: REHABILITATION | Age: 24
End: 2024-03-12
Attending: STUDENT IN AN ORGANIZED HEALTH CARE EDUCATION/TRAINING PROGRAM
Payer: COMMERCIAL

## 2024-03-26 ENCOUNTER — ANCILLARY PROCEDURE (OUTPATIENT)
Dept: MATERNAL FETAL MEDICINE | Facility: MEDICAL CENTER | Age: 24
End: 2024-03-26
Attending: OBSTETRICS & GYNECOLOGY
Payer: COMMERCIAL

## 2024-03-26 VITALS
BODY MASS INDEX: 36.07 KG/M2 | DIASTOLIC BLOOD PRESSURE: 83 MMHG | SYSTOLIC BLOOD PRESSURE: 116 MMHG | HEART RATE: 90 BPM | WEIGHT: 237.2 LBS

## 2024-03-26 DIAGNOSIS — Z3A.12 12 WEEKS GESTATION OF PREGNANCY: ICD-10-CM

## 2024-03-26 DIAGNOSIS — Z36.82 ENCOUNTER FOR ANTENATAL SCREENING FOR NUCHAL TRANSLUCENCY: ICD-10-CM

## 2024-03-26 DIAGNOSIS — Z33.1 PREGNANT STATE, INCIDENTAL: ICD-10-CM

## 2024-03-26 PROCEDURE — 76813 OB US NUCHAL MEAS 1 GEST: CPT | Performed by: OBSTETRICS & GYNECOLOGY

## 2024-03-26 PROCEDURE — 76801 OB US < 14 WKS SINGLE FETUS: CPT | Performed by: OBSTETRICS & GYNECOLOGY

## 2024-03-26 ASSESSMENT — FIBROSIS 4 INDEX: FIB4 SCORE: 0.31

## 2024-05-21 ENCOUNTER — ANCILLARY PROCEDURE (OUTPATIENT)
Dept: MATERNAL FETAL MEDICINE | Facility: MEDICAL CENTER | Age: 24
End: 2024-05-21
Attending: OBSTETRICS & GYNECOLOGY
Payer: COMMERCIAL

## 2024-05-21 VITALS
DIASTOLIC BLOOD PRESSURE: 84 MMHG | HEART RATE: 104 BPM | BODY MASS INDEX: 36.93 KG/M2 | WEIGHT: 242.9 LBS | SYSTOLIC BLOOD PRESSURE: 124 MMHG

## 2024-05-21 DIAGNOSIS — O35.BXX0 ANOMALY OF HEART OF FETUS AFFECTING PREGNANCY, ANTEPARTUM, SINGLE OR UNSPECIFIED FETUS: ICD-10-CM

## 2024-05-21 DIAGNOSIS — F12.90 MARIJUANA USE DURING PREGNANCY: ICD-10-CM

## 2024-05-21 DIAGNOSIS — O99.320 MARIJUANA USE DURING PREGNANCY: ICD-10-CM

## 2024-05-21 DIAGNOSIS — O99.512 ASTHMA AFFECTING PREGNANCY IN SECOND TRIMESTER: ICD-10-CM

## 2024-05-21 DIAGNOSIS — Z3A.21 21 WEEKS GESTATION OF PREGNANCY: ICD-10-CM

## 2024-05-21 DIAGNOSIS — J45.909 ASTHMA AFFECTING PREGNANCY IN SECOND TRIMESTER: ICD-10-CM

## 2024-05-21 DIAGNOSIS — Z33.1 PREGNANT STATE, INCIDENTAL: ICD-10-CM

## 2024-05-21 PROCEDURE — 76811 OB US DETAILED SNGL FETUS: CPT | Performed by: OBSTETRICS & GYNECOLOGY

## 2024-05-21 PROCEDURE — 99212 OFFICE O/P EST SF 10 MIN: CPT | Performed by: OBSTETRICS & GYNECOLOGY

## 2024-05-21 PROCEDURE — 76817 TRANSVAGINAL US OBSTETRIC: CPT | Performed by: OBSTETRICS & GYNECOLOGY

## 2024-05-21 ASSESSMENT — FIBROSIS 4 INDEX: FIB4 SCORE: 0.31

## 2024-05-26 ENCOUNTER — HOSPITAL ENCOUNTER (EMERGENCY)
Facility: MEDICAL CENTER | Age: 24
End: 2024-05-26
Attending: OBSTETRICS & GYNECOLOGY | Admitting: OBSTETRICS & GYNECOLOGY
Payer: COMMERCIAL

## 2024-05-26 VITALS
HEIGHT: 68 IN | DIASTOLIC BLOOD PRESSURE: 76 MMHG | SYSTOLIC BLOOD PRESSURE: 130 MMHG | TEMPERATURE: 97.2 F | WEIGHT: 240.52 LBS | HEART RATE: 110 BPM | OXYGEN SATURATION: 96 % | BODY MASS INDEX: 36.45 KG/M2

## 2024-05-26 LAB
APPEARANCE UR: CLEAR
COLOR UR AUTO: ABNORMAL
GLUCOSE UR QL STRIP.AUTO: NEGATIVE MG/DL
KETONES UR QL STRIP.AUTO: NEGATIVE MG/DL
LEUKOCYTE ESTERASE UR QL STRIP.AUTO: NEGATIVE
NITRITE UR QL STRIP.AUTO: NEGATIVE
PH UR STRIP.AUTO: 5.5 [PH] (ref 5–8)
PROT UR QL STRIP: ABNORMAL MG/DL
RBC UR QL AUTO: NEGATIVE
SP GR UR STRIP.AUTO: >=1.03 (ref 1–1.03)

## 2024-05-26 PROCEDURE — 99282 EMERGENCY DEPT VISIT SF MDM: CPT

## 2024-05-26 PROCEDURE — 81002 URINALYSIS NONAUTO W/O SCOPE: CPT

## 2024-05-26 PROCEDURE — 99283 EMERGENCY DEPT VISIT LOW MDM: CPT | Mod: GC | Performed by: OBSTETRICS & GYNECOLOGY

## 2024-05-26 ASSESSMENT — PAIN SCALES - GENERAL: PAINLEVEL: 0 - NO PAIN

## 2024-05-26 ASSESSMENT — FIBROSIS 4 INDEX: FIB4 SCORE: 0.31

## 2024-05-26 NOTE — PROGRESS NOTES
Pt arrived with c/o vaginal bleeding when she wiped after voiding earlier today. Denies LOF or cramping. Reports +fetal movement. Fetal doppler reassuring     1657- SSE, no blood visualized in vaginal vault.     1815- Discharge instructions reviewed with pt and support including when to return. Pt able to verbalize understanding. Pt and Fob ambulate off unit independently.

## 2024-05-27 NOTE — ED PROVIDER NOTES
LABOR AND DELIVERY TRIAGE NOTE    PATIENT ID:  NAME:  Kristie Luciano  MRN:               [id number]  YOB: 2000     24 y.o. female  at 20w5d.    Subjective: Pt presents for vaginal bleeding. Pt states that she was at work as a  2 hours prior to arrival and went to the bathroom and noticed a small streak of blood on the toilet paper when wiping after urinating. Has not had ongoing bleeding. Denies intercourse or anything else in the vagina in the past 24 hours. Had a transvaginal ultrasound on  and felt like the probe did hit her cervix. She denies abdominal pain, pelvic pressure, back pain, or contractions. No LOF, affirms normal FM.     Pregnancy uncomplicated, receives PNC with [facility] but would like to switch to [facility]. History of 1 term , infant  of SIDs at 5 months.     negative for contractions  negative pain   negative for LOF  positive for vaginal bleeding  positive for fetal movement    ROS: Patient denies any fever chills, nausea, vomiting, headache, chest pain, shortness of breath, or dysuria or unusual swelling of hands or feet.     PMHx:   Past Medical History:   Diagnosis Date    Asthma         OB History    Para Term  AB Living   2 1 1         SAB IAB Ectopic Molar Multiple Live Births             1      # Outcome Date GA Lbr Karel/2nd Weight Sex Delivery Anes PTL Lv   2 Current            1 Term 23    M Vag-Spont EPI N DEC         GYN: denies STIs, no cervical procedures, no hx of abnormal pap smears    PSHx:  Past Surgical History:   Procedure Laterality Date    LUMBAR LAMINECTOMY DISKECTOMY Left 2023    Procedure: LUMBAR 4-LUMBAR 5 MICRODISCECTOMY, LUMBAR 4 LAMINOTOMY;  Surgeon: Rocco Gimenez M.D.;  Location: SURGERY [facility];  Service: Neurosurgery       Social Hx:  Social History     Tobacco Use    Smoking status: Former     Types: Cigarettes    Tobacco comments:     Vap tobacco products    Substance Use Topics    Alcohol  "use: Not Currently    Drug use: Not Currently     Types: Marijuana         Medications:  No current facility-administered medications on file prior to encounter.     Current Outpatient Medications on File Prior to Encounter   Medication Sig Dispense Refill    acetaminophen (TYLENOL) 500 MG Tab Take 2 Tablets by mouth in the morning, at noon, and at bedtime.      albuterol 108 (90 Base) MCG/ACT Aero Soln inhalation aerosol Inhale 2 Puffs every 6 hours as needed for Shortness of Breath. 8.5 g 0    cephALEXin (KEFLEX) 500 MG Cap Take 1 capsule by mouth four times a day (Patient not taking: Reported on 2024) 28 Capsule 0    albuterol 108 (90 Base) MCG/ACT Aero Soln inhalation aerosol Inhale 2 Puffs every four hours as needed for Shortness of Breath. 8.5 g 1       Allergies:  NSAIDs - Hives     Objective:    Vitals:    24 1644 24 1645 24 1724   BP:  130/76    Pulse:  (!) 110    Temp:  36.2 °C (97.2 °F)    TempSrc:  Temporal    SpO2:  96%    Weight:   109 kg (240 lb 8.4 oz)   Height: 1.727 m (5' 8\")       Temp (24hrs), Av.2 °C (97.2 °F), Min:36.2 °C (97.2 °F), Max:36.2 °C (97.2 °F)    General: No acute distress, resting comfortably in bed.  HEENT: normocephalic, nontraumatic, PERRLA, EOMI  Cardiovascular: Heart RRR with no murmurs, rubs or gallops. Distal Pulses 2+  Respiratory: symmetric chest expansion, lungs CTAB, with no wheezes, rales, rhonci  Abdomen: gravid, nontender  : Sterile speculum examination revealed no fluid, blood, or abnormal discharge in the vaginal vault.  Cervix not visualized. No external trauma, lesions, or bleeding.   Musculoskeletal: QUINONES spontaneously  Neuro: non focal with no numbness, tingling or changes in sensation    Labs:   None     Assessment: 24 y.o. female  at 20w5d presents with 1 episode of vaginal bleeding approximately 2 hours prior to arrival, patient reports a smear of dark bleeding on toilet paper. No LOF, contractions, pain/pressure, +FM. Fetal " doppler normal in triage. Denies recent intercourse though had a transvaginal ultrasound on 5/21. This showed no previa with placenta high and posterior. Bleeding likely secondary to trauma from the ultrasound. Sterile speculum examination was RN unremarkable though cervix was not visualized; repeat speculum exam by MD offered to collect swabs/wet prep and re-attempt visualization of the cervix but patient declined, stated that she felt reassured after hearing normal heart tones. POCT urinalysis obtained, pending. Plan to discharge home. Educated patient on when to return including painful uterine contractions or pelvic pressure, ongoing vaginal bleeding, loss of fluid, decreased FM, or other serious symptoms.    Discussed case with Dr. Samantha Bernardo, [facility] Attending. Case was discussed and attending agreed with plan prior to discharge of patient.      Shirlene Medrano D.O.  PGY-1, [facility]

## 2024-06-13 ENCOUNTER — HOSPITAL ENCOUNTER (EMERGENCY)
Facility: MEDICAL CENTER | Age: 24
End: 2024-06-13
Payer: COMMERCIAL

## 2024-06-13 ENCOUNTER — HOSPITAL ENCOUNTER (EMERGENCY)
Facility: MEDICAL CENTER | Age: 24
End: 2024-06-13
Attending: OBSTETRICS & GYNECOLOGY | Admitting: OBSTETRICS & GYNECOLOGY
Payer: COMMERCIAL

## 2024-06-13 VITALS
OXYGEN SATURATION: 98 % | DIASTOLIC BLOOD PRESSURE: 78 MMHG | HEART RATE: 88 BPM | BODY MASS INDEX: 36.37 KG/M2 | WEIGHT: 240 LBS | TEMPERATURE: 97.2 F | SYSTOLIC BLOOD PRESSURE: 125 MMHG | HEIGHT: 68 IN

## 2024-06-13 VITALS
BODY MASS INDEX: 36.65 KG/M2 | HEART RATE: 97 BPM | WEIGHT: 241.84 LBS | SYSTOLIC BLOOD PRESSURE: 132 MMHG | RESPIRATION RATE: 18 BRPM | DIASTOLIC BLOOD PRESSURE: 79 MMHG | HEIGHT: 68 IN | TEMPERATURE: 97.1 F | OXYGEN SATURATION: 94 %

## 2024-06-13 PROCEDURE — 99282 EMERGENCY DEPT VISIT SF MDM: CPT

## 2024-06-13 ASSESSMENT — FIBROSIS 4 INDEX
FIB4 SCORE: 0.31
FIB4 SCORE: 0.31

## 2024-06-13 NOTE — LETTER
2024      Kristie Luciano is currently pregnant and being cared for by  .     She is medically cleared for:    Dental exams and routine cleaning  Tooth fillings and extractions as needed  Antibiotic therapy as appropriate  Local anesthesia    Patient may be administered the followin% Lidocaine with 1:100,000 Epinephrine  4% Septocaine with 1:100,000 Epinephrine  Nitrous Oxide  A narcotic or non-narcotic pain medication  An antibiotic such as penicillin or clindamycin    NO TETRACYCLINE and NO CODEINE        Thank you,          Willie Finley M.D.   PGY-1  [facility]      Electronically Signed

## 2024-06-13 NOTE — PROGRESS NOTES
EDC - 23.2 EGA - 10/8    1222 - Pt arrived to labor and delivery for dental pain. Pt placed in room LDA2.    1242 - Doppler . Category I FHT at this time. VSS. Pt reports good FM. No complaints of contractions, ROM or vaginal bleeding.  POC discussed with pt and family members, all questions answered.      1254 Discharge instructions given including PTL precautions, and when to return to the hospital, Pt verbalizes understanding at this time. All questions answered. Pt ambulated off the unit with personal belongings in place.

## 2024-06-13 NOTE — ED PROVIDER NOTES
LABOR AND DELIVERY TRIAGE NOTE    PATIENT ID:  NAME:  Kristie Luciano  MRN:               [id number]  YOB: 2000     24 y.o. female  at 23w2d.    Subjective: Pt presenting with dental pain x48 hours. She states she broke her left lower molar and has been in significant pain, sleeping no more than 30 minutes at a time. She has tried seeing a dentist and was told she needed clearance by OBGYN prior to any procedures.     Pregnancy complicated by small muscular inlet VSD on 20w US. THC use.    negative for contractions  positive - dental pain   negative for LOF  negative for vaginal bleeding  positive for fetal movement    PNL:  Not yet completed. Will complete at intake with [facility] on .      ROS: Patient denies any fever chills, nausea, vomiting, headache, chest pain, shortness of breath, or dysuria or unusual swelling of hands or feet.     PMHx:   Past Medical History:   Diagnosis Date    Asthma         OB History    Para Term  AB Living   2 1 1         SAB IAB Ectopic Molar Multiple Live Births             1      # Outcome Date GA Lbr Karel/2nd Weight Sex Delivery Anes PTL Lv   2 Current            1 Term 23    M Vag-Spont EPI N DEC       GYN: denies STIs, no cervical procedures    PSHx:  Past Surgical History:   Procedure Laterality Date    LUMBAR LAMINECTOMY DISKECTOMY Left 2023    Procedure: LUMBAR 4-LUMBAR 5 MICRODISCECTOMY, LUMBAR 4 LAMINOTOMY;  Surgeon: Rocco Gimenez M.D.;  Location: SURGERY [facility];  Service: Neurosurgery       Social Hx:  Social History     Tobacco Use    Smoking status: Former     Types: Cigarettes    Tobacco comments:     Vap tobacco products    Substance Use Topics    Alcohol use: Not Currently    Drug use: Not Currently     Types: Marijuana         Medications:  No current facility-administered medications on file prior to encounter.     Current Outpatient Medications on File Prior to Encounter   Medication Sig Dispense  Refill    albuterol 108 (90 Base) MCG/ACT Aero Soln inhalation aerosol Inhale 2 Puffs every 6 hours as needed for Shortness of Breath. 8.5 g 0    cephALEXin (KEFLEX) 500 MG Cap Take 1 capsule by mouth four times a day (Patient not taking: Reported on 2024) 28 Capsule 0    acetaminophen (TYLENOL) 500 MG Tab Take 2 Tablets by mouth in the morning, at noon, and at bedtime.      albuterol 108 (90 Base) MCG/ACT Aero Soln inhalation aerosol Inhale 2 Puffs every four hours as needed for Shortness of Breath. 8.5 g 1       Allergies:  Allergies   Allergen Reactions    Nsaids Hives          Objective:    There were no vitals filed for this visit.  No data recorded.    General: No acute distress, resting comfortably in bed.  HEENT: normocephalic, nontraumatic, PERRLA, EOMI  Cardiovascular: Heart RRR with no murmurs, rubs or gallops. Distal Pulses 2+  Respiratory: symmetric chest expansion, lungs CTAB, with no wheezes, rales, rhonci  Abdomen: gravid, nontender  Musculoskeletal: QUINONES spontaneously  Neuro: non focal with no numbness, tingling or changes in sensation    Cervix:  Deferred.  [place]: Uterine Contractions absent.   FHRM: + heart tones on doppler    Labs:   None    Assessment: 24 y.o. female  at 23w2d presents with dental pain, requesting clearance     Plan:   - Pt cleared for dental procedure. Printed note for dental clearance and handed to patient prior to DC  - Pt scheduled to transfer care from [facility] to [facility], intake on 2024  - Patient is cleared to return home with family. Encouraged to see MD/DO for increased painful uterine contractions @ 3-5, vaginal bleeding, loss of fluid, or other serious symptoms.      Discussed case with Dr. Alcantar, [facility] Attending. Case was discussed and attending agreed with plan prior to discharge of patient.        Future Appointments   Date Time Provider Department Center   2024  7:30 AM PC INTAKE PCTR ELFEGO Finley M.D.   PGY-1  [facility]

## 2024-06-13 NOTE — ED NOTES
Pt ambulatory to triage for dental pain & decreased fetal movement. Is 22 weeks pregnant. Pt also c/o decreased fetal movement. L&D charge Theodora contacted & pt sent directly to labor & delivery per ER & L&D protocol. Stable.

## 2024-07-15 ENCOUNTER — APPOINTMENT (OUTPATIENT)
Dept: OBGYN | Facility: CLINIC | Age: 24
End: 2024-07-15
Payer: COMMERCIAL

## 2024-07-15 VITALS — WEIGHT: 247 LBS | DIASTOLIC BLOOD PRESSURE: 54 MMHG | SYSTOLIC BLOOD PRESSURE: 104 MMHG | BODY MASS INDEX: 37.56 KG/M2

## 2024-07-15 DIAGNOSIS — Z34.02 ENCOUNTER FOR SUPERVISION OF NORMAL FIRST PREGNANCY IN SECOND TRIMESTER: ICD-10-CM

## 2024-07-15 DIAGNOSIS — Z86.59 HISTORY OF POSTPARTUM DEPRESSION: ICD-10-CM

## 2024-07-15 DIAGNOSIS — O28.3 ABNORMAL FETAL ULTRASOUND: ICD-10-CM

## 2024-07-15 DIAGNOSIS — Z84.82 FAMILY HISTORY OF SIDS (SUDDEN INFANT DEATH SYNDROME): ICD-10-CM

## 2024-07-15 DIAGNOSIS — Z87.59 HISTORY OF POSTPARTUM DEPRESSION: ICD-10-CM

## 2024-07-15 PROCEDURE — 90471 IMMUNIZATION ADMIN: CPT

## 2024-07-15 PROCEDURE — 0500F INITIAL PRENATAL CARE VISIT: CPT

## 2024-07-15 PROCEDURE — 90715 TDAP VACCINE 7 YRS/> IM: CPT

## 2024-07-15 ASSESSMENT — EDINBURGH POSTNATAL DEPRESSION SCALE (EPDS)
I HAVE BLAMED MYSELF UNNECESSARILY WHEN THINGS WENT WRONG: NO, NEVER
I HAVE BEEN ANXIOUS OR WORRIED FOR NO GOOD REASON: HARDLY EVER
THE THOUGHT OF HARMING MYSELF HAS OCCURRED TO ME: NEVER
TOTAL SCORE: 3
I HAVE BEEN SO UNHAPPY THAT I HAVE BEEN CRYING: NO, NEVER
I HAVE BEEN ABLE TO LAUGH AND SEE THE FUNNY SIDE OF THINGS: AS MUCH AS I ALWAYS COULD
I HAVE FELT SAD OR MISERABLE: NO, NOT AT ALL
I HAVE BEEN SO UNHAPPY THAT I HAVE HAD DIFFICULTY SLEEPING: NOT AT ALL
I HAVE LOOKED FORWARD WITH ENJOYMENT TO THINGS: AS MUCH AS I EVER DID
THINGS HAVE BEEN GETTING ON TOP OF ME: NO, MOST OF THE TIME I HAVE COPED QUITE WELL
I HAVE FELT SCARED OR PANICKY FOR NO GOOD REASON: NO, NOT MUCH

## 2024-07-15 ASSESSMENT — FIBROSIS 4 INDEX: FIB4 SCORE: 0.31

## 2024-07-18 ENCOUNTER — TELEPHONE (OUTPATIENT)
Dept: OBGYN | Facility: CLINIC | Age: 24
End: 2024-07-18
Payer: COMMERCIAL

## 2024-07-18 ENCOUNTER — HOSPITAL ENCOUNTER (EMERGENCY)
Facility: MEDICAL CENTER | Age: 24
End: 2024-07-18
Attending: OBSTETRICS & GYNECOLOGY | Admitting: OBSTETRICS & GYNECOLOGY
Payer: COMMERCIAL

## 2024-07-18 VITALS
HEIGHT: 68 IN | SYSTOLIC BLOOD PRESSURE: 128 MMHG | TEMPERATURE: 97.5 F | OXYGEN SATURATION: 96 % | WEIGHT: 247 LBS | DIASTOLIC BLOOD PRESSURE: 72 MMHG | RESPIRATION RATE: 18 BRPM | BODY MASS INDEX: 37.44 KG/M2 | HEART RATE: 100 BPM

## 2024-07-18 PROCEDURE — 59025 FETAL NON-STRESS TEST: CPT

## 2024-07-18 PROCEDURE — 99284 EMERGENCY DEPT VISIT MOD MDM: CPT

## 2024-07-18 ASSESSMENT — FIBROSIS 4 INDEX: FIB4 SCORE: 0.31

## 2024-07-18 ASSESSMENT — PAIN SCALES - GENERAL: PAINLEVEL: 0 - NO PAIN

## 2024-07-31 ENCOUNTER — ROUTINE PRENATAL (OUTPATIENT)
Dept: OBGYN | Facility: CLINIC | Age: 24
End: 2024-07-31
Payer: COMMERCIAL

## 2024-07-31 VITALS — SYSTOLIC BLOOD PRESSURE: 110 MMHG | WEIGHT: 247 LBS | BODY MASS INDEX: 37.56 KG/M2 | DIASTOLIC BLOOD PRESSURE: 60 MMHG

## 2024-07-31 DIAGNOSIS — F41.9 ANXIETY: ICD-10-CM

## 2024-07-31 RX ORDER — SERTRALINE HYDROCHLORIDE 25 MG/1
25 TABLET, FILM COATED ORAL DAILY
Qty: 90 TABLET | Refills: 11 | Status: SHIPPED | OUTPATIENT
Start: 2024-07-31

## 2024-07-31 ASSESSMENT — FIBROSIS 4 INDEX: FIB4 SCORE: 0.31

## 2024-08-21 ENCOUNTER — ROUTINE PRENATAL (OUTPATIENT)
Dept: OBGYN | Facility: CLINIC | Age: 24
End: 2024-08-21
Payer: COMMERCIAL

## 2024-08-21 VITALS — BODY MASS INDEX: 38.32 KG/M2 | WEIGHT: 252 LBS | DIASTOLIC BLOOD PRESSURE: 70 MMHG | SYSTOLIC BLOOD PRESSURE: 104 MMHG

## 2024-08-21 DIAGNOSIS — O28.3 ABNORMAL FETAL ULTRASOUND: ICD-10-CM

## 2024-08-21 PROCEDURE — 3074F SYST BP LT 130 MM HG: CPT | Performed by: NURSE PRACTITIONER

## 2024-08-21 PROCEDURE — 0502F SUBSEQUENT PRENATAL CARE: CPT | Performed by: NURSE PRACTITIONER

## 2024-08-21 PROCEDURE — 3078F DIAST BP <80 MM HG: CPT | Performed by: NURSE PRACTITIONER

## 2024-08-21 ASSESSMENT — FIBROSIS 4 INDEX: FIB4 SCORE: 0.31

## 2024-08-21 NOTE — PROGRESS NOTES
S:  Pt is  at 33w1d for routine OB follow up.  Pt is feeling anxious about fetal VSD seen at 20 wk US. She does not want to begin taking zoloft unless she has another US. Pt reassured that VSD will likely close after birth. Pt remains anxious. No ED or hospital visits since last seen. Reports good FM.  Denies VB, LOF, RUCs or vaginal DC.    O:  Please see above vitals.        Pt has not gotten 3rd trimester labs drawn. States she will do.        Prenatal labs not sent from [facility]-will call again          A:  IUP at 33w1d  Patient Active Problem List    Diagnosis Date Noted    Anxiety 2024    Encounter for supervision of normal first pregnancy in second trimester 07/15/2024    Abnormal fetal ultrasound 07/15/2024    Hx  loss d/y asphyxiation 07/15/2024    History of postpartum depression 07/15/2024        P:  1.  GBS @ 36 wks.          2.  Continue FKCs.          3.  Questions answered.          4.  Encouraged pt to tour L&D.          5.  Encourage adequate water intake.        6.  Discussed childbirth education, discussed carseat safety, WIC information given        7.  F/u 2 wks.    Addendum: Discussed with Dr. Bronson and in light of her history of  death, okay to repeat US. Ordered.  Prenatal labs faxed from [facility]: loreto and in media

## 2024-08-21 NOTE — PROGRESS NOTES
OB follow up   + fetal movement.  No VB, LOF or UC's.  Phone # [phone]  Preferred pharmacy confirmed.  3rd trimester labs not done yet.  PNP results never requested.  Not taking Zoloft. Worry about causing problems to baby.

## 2024-08-22 ENCOUNTER — ANCILLARY PROCEDURE (OUTPATIENT)
Dept: OBGYN | Facility: CLINIC | Age: 24
End: 2024-08-22
Attending: NURSE PRACTITIONER
Payer: COMMERCIAL

## 2024-08-22 VITALS — WEIGHT: 248.8 LBS | BODY MASS INDEX: 37.83 KG/M2 | SYSTOLIC BLOOD PRESSURE: 128 MMHG | DIASTOLIC BLOOD PRESSURE: 86 MMHG

## 2024-08-22 DIAGNOSIS — O28.3 ABNORMAL FETAL ULTRASOUND: ICD-10-CM

## 2024-08-22 PROCEDURE — 76816 OB US FOLLOW-UP PER FETUS: CPT | Performed by: OBSTETRICS & GYNECOLOGY

## 2024-08-22 ASSESSMENT — FIBROSIS 4 INDEX: FIB4 SCORE: 0.31

## 2024-08-30 PROBLEM — O28.3 ABNORMAL FETAL ULTRASOUND: Status: RESOLVED | Noted: 2024-07-15 | Resolved: 2024-08-30

## 2024-09-05 ENCOUNTER — ROUTINE PRENATAL (OUTPATIENT)
Dept: OBGYN | Facility: CLINIC | Age: 24
End: 2024-09-05
Payer: COMMERCIAL

## 2024-09-05 ENCOUNTER — HOSPITAL ENCOUNTER (EMERGENCY)
Facility: MEDICAL CENTER | Age: 24
End: 2024-09-05
Attending: STUDENT IN AN ORGANIZED HEALTH CARE EDUCATION/TRAINING PROGRAM | Admitting: STUDENT IN AN ORGANIZED HEALTH CARE EDUCATION/TRAINING PROGRAM
Payer: COMMERCIAL

## 2024-09-05 VITALS
DIASTOLIC BLOOD PRESSURE: 76 MMHG | SYSTOLIC BLOOD PRESSURE: 130 MMHG | TEMPERATURE: 96.9 F | HEIGHT: 68 IN | BODY MASS INDEX: 38.19 KG/M2 | WEIGHT: 252 LBS | HEART RATE: 102 BPM | OXYGEN SATURATION: 97 % | RESPIRATION RATE: 20 BRPM

## 2024-09-05 VITALS — BODY MASS INDEX: 38.41 KG/M2 | WEIGHT: 252.6 LBS | DIASTOLIC BLOOD PRESSURE: 72 MMHG | SYSTOLIC BLOOD PRESSURE: 118 MMHG

## 2024-09-05 DIAGNOSIS — Z34.02 ENCOUNTER FOR SUPERVISION OF NORMAL FIRST PREGNANCY IN SECOND TRIMESTER: ICD-10-CM

## 2024-09-05 PROCEDURE — 59025 FETAL NON-STRESS TEST: CPT | Performed by: PHYSICIAN ASSISTANT

## 2024-09-05 PROCEDURE — 99283 EMERGENCY DEPT VISIT LOW MDM: CPT

## 2024-09-05 PROCEDURE — 99282 EMERGENCY DEPT VISIT SF MDM: CPT | Mod: 25 | Performed by: NURSE PRACTITIONER

## 2024-09-05 PROCEDURE — 3078F DIAST BP <80 MM HG: CPT | Performed by: PHYSICIAN ASSISTANT

## 2024-09-05 PROCEDURE — 0502F SUBSEQUENT PRENATAL CARE: CPT | Performed by: PHYSICIAN ASSISTANT

## 2024-09-05 PROCEDURE — 59025 FETAL NON-STRESS TEST: CPT | Mod: 26 | Performed by: NURSE PRACTITIONER

## 2024-09-05 PROCEDURE — 59025 FETAL NON-STRESS TEST: CPT

## 2024-09-05 PROCEDURE — 3074F SYST BP LT 130 MM HG: CPT | Performed by: PHYSICIAN ASSISTANT

## 2024-09-05 ASSESSMENT — FIBROSIS 4 INDEX
FIB4 SCORE: 0.31
FIB4 SCORE: 0.31

## 2024-09-05 NOTE — PROGRESS NOTES
Pt has no complaints with cramping, UCs, Vb, LOF though pt works as / and gets a lot of UCs during and after shifts and has occ d/c but no itching or burning. +FM but less today. Pt adamantly wants IOL due to severe back pain, as she had surgery 11/23 and needs another in future. Pt also has hx of SIDS and is very anxious for delivery. Cervix: ~1/75/-2, very post, soft, vtx. GBS next visit. Labor precautions reviewed. Daily FKC recommended. Can do NSTs weekly until delivery. RTC 1 wk or sooner prn.

## 2024-09-05 NOTE — PROGRESS NOTES
Pt. Here for OB/FU. Reports Good FM.   Good # [phone] (home)   Pt states has been leaking fluid and having contractions, pt would like cervical exam.   Pt also having severe back pain states had back surgery in 11/2024  Pt states hasn't done 1 HR yet because she only has one car for transportation.

## 2024-09-05 NOTE — PROGRESS NOTES
HPI: Kristie is a 25 y/o female, , hx of SIDS and degenerative disc disease presenting at 35 weeks 2 days gestation for routine prenatal f/u. She c/o of regular contractions daily, lasting 1-2 minutes, that are strong enough to where she needs to rest while at work. She works as a  and is on her feet and mobile. Encouraged her to increase water intake and she is using a yoga ball at home. She is having intermittent fluid leakage daily that is a thin, watery discharge. She is nauseous daily but no vomiting episodes, has not taken any medication for nausea and does not want to. No issues reported with eating. She denies vaginal bleeding and abdominal cramping/pain. She expressed desire to have IOL as soon as possible due to her back pain, but given she has no medical indication for having an IOL sooner, educated pt that we can schedule one for when she's at 39 weeks gestation but not sooner. She also reported decreased fetal movement recently, offered to do NST for reassurance and she accepted.     PE  Prenatal vitals: Fundal height-->36 cm       FHR-->145  Cervical exam: 1/75%/-2    A&P  25 y/o, , 35 weeks 2 days, hx of SIDS and degenerative disc disease, presenting for routine prenatal f/u.   GBS will be collected at next visit 1 week from now  Pt refused to do 1hr GTT testing; discussed with pt that while in hospital they will check baby's blood sugar levels.   Elective IOL scheduled for 10/4/24  Encouraged pt to continue monitoring fetal movement  PTL precautions discussed with pt

## 2024-09-06 NOTE — PROGRESS NOTES
EDC - 10/8/24 EGA - 2103 - Pt arrived to labor and delivery for c/o contractions. Pt placed in LDA 6.     - External monitors in place X2. Category I FHT at this time. VSS. Pt reports good FM. No complaints of ROM or vaginal bleeding. FOB at bedside. POC discussed with pt and family members, all questions answered.       - Report given to DARSHANA Henley regarding pt status and complaint, as well as SVE. Provider at bedside. Discharge orders received.      - Pt educated on discharge instructions, states no questions at this time. Pt ambulated off unit in stable condition with FOB.

## 2024-09-06 NOTE — ED PROVIDER NOTES
"  PATIENT ID:  NAME:  Kristie Luciano  MRN:               [id number]  YOB: 2000     24 y.o. female  at 35w2d.    Subjective: Pt reports cramping and CTX after cervical exam today in office  Pregnancy complicated by Hx of PPD and  loss    Positive  For iirreg CTXS.   negative Feels pain   negative for LOF  negative for vaginal bleeding.   positive for fetal movement    ROS: Patient denies any fever chills, nausea, vomiting, headache, chest pain, shortness of breath, or dysuria or unusual swelling of hands or feet.     Objective:    Vitals:    24 2120   BP: 130/76   Pulse: (!) 102   Resp: 20   Temp: 36.1 °C (96.9 °F)   TempSrc: Temporal   SpO2: 97%   Weight: 114 kg (252 lb)   Height: 1.727 m (5' 8\")     Temp (24hrs), Av.1 °C (96.9 °F), Min:36.1 °C (96.9 °F), Max:36.1 °C (96.9 °F)    General: No acute distress, resting comfortably in bed.  HEENT: normocephalic, nontraumatic, PERRLA, EOMI  Cardiovascular: Heart RRR with no murmurs, rubs or gallops. Distal Pulses 2+  Respiratory: symmetric chest expansion, lungs CTAB, with no wheezes, rales, rhonci  Abdomen: gravid, nontender  Musculoskeletal: strength 5/5 in four extremities  Neuro: non focal with no numbness, tingling or changes in sensation    Cervix:  1cm/75%/-2, no change for office exam  [place]: Uterine Contractions: irreg.   FHRM: Baseline 135, Accels to 165, no decels, moderate variability    Assessment: 24 y.o. female  at 35w2d.    NST reactive per my read    Plan:   1. Patient is cleared to return home.   2. F/U in office for [facility] or [facility] hospital for PTL symptoms  3. Instructions for PTL given    "

## 2024-09-13 ENCOUNTER — ROUTINE PRENATAL (OUTPATIENT)
Dept: OBGYN | Facility: CLINIC | Age: 24
End: 2024-09-13
Payer: COMMERCIAL

## 2024-09-13 ENCOUNTER — HOSPITAL ENCOUNTER (OUTPATIENT)
Facility: MEDICAL CENTER | Age: 24
End: 2024-09-13
Attending: PHYSICIAN ASSISTANT
Payer: COMMERCIAL

## 2024-09-13 VITALS — DIASTOLIC BLOOD PRESSURE: 70 MMHG | SYSTOLIC BLOOD PRESSURE: 100 MMHG | BODY MASS INDEX: 38.74 KG/M2 | WEIGHT: 254.8 LBS

## 2024-09-13 DIAGNOSIS — Z34.02 ENCOUNTER FOR SUPERVISION OF NORMAL FIRST PREGNANCY IN SECOND TRIMESTER: ICD-10-CM

## 2024-09-13 PROCEDURE — 0502F SUBSEQUENT PRENATAL CARE: CPT | Performed by: PHYSICIAN ASSISTANT

## 2024-09-13 PROCEDURE — 3074F SYST BP LT 130 MM HG: CPT | Performed by: PHYSICIAN ASSISTANT

## 2024-09-13 PROCEDURE — 87150 DNA/RNA AMPLIFIED PROBE: CPT

## 2024-09-13 PROCEDURE — 3078F DIAST BP <80 MM HG: CPT | Performed by: PHYSICIAN ASSISTANT

## 2024-09-13 PROCEDURE — 87081 CULTURE SCREEN ONLY: CPT

## 2024-09-13 ASSESSMENT — FIBROSIS 4 INDEX: FIB4 SCORE: 0.31

## 2024-09-13 NOTE — PROGRESS NOTES
Pt here for OB follow-up  Reports +FM  No VB, LOF, or UC  Phone number: [phone]  Pharmacy verified with pt.  Pt states her back is hurting so bad especially since she had her back surgery in 11/2023, pelvic pressure, some contractions     Midtrimester labs, states she was told she no longer needs to do  it  9/5/24 for cramping and contractions     GBS today

## 2024-09-13 NOTE — PROGRESS NOTES
Pt has no complaints with current UCs, Vb, LOF though pt continues to have UCs at work as . +FM. IOL scheduled 10/1 at 9pm, as pt would like delivery on 10/2 if possible. It was also the 2nd anniversary death of her son, so she is getting anxious for delivery, happy with plan. Pt continues to have hip and back pain, evelyne at work, and feeling very tired, but otherwise doing well. Pt plans to continue to work, though has no maternity leave plan, so per pt, she was told she wouldn't have the job after she left. Pt will let us know when she wants to start leave. GBS collected. Cervix: 1/60/-2, post, med, vtx. Labor precautions reviewed. Daily FKC recommended. RTC 1 wk or sooner prn.

## 2024-09-14 LAB — GP B STREP DNA SPEC QL NAA+PROBE: NEGATIVE

## 2024-09-18 ENCOUNTER — TELEPHONE (OUTPATIENT)
Dept: OBGYN | Facility: CLINIC | Age: 24
End: 2024-09-18

## 2024-09-18 ENCOUNTER — ROUTINE PRENATAL (OUTPATIENT)
Dept: OBGYN | Facility: CLINIC | Age: 24
End: 2024-09-18
Payer: COMMERCIAL

## 2024-09-18 VITALS — WEIGHT: 256 LBS | DIASTOLIC BLOOD PRESSURE: 60 MMHG | BODY MASS INDEX: 38.92 KG/M2 | SYSTOLIC BLOOD PRESSURE: 120 MMHG

## 2024-09-18 DIAGNOSIS — Z34.03 ENCOUNTER FOR SUPERVISION OF NORMAL FIRST PREGNANCY IN THIRD TRIMESTER: ICD-10-CM

## 2024-09-18 PROCEDURE — 3074F SYST BP LT 130 MM HG: CPT

## 2024-09-18 PROCEDURE — 0502F SUBSEQUENT PRENATAL CARE: CPT

## 2024-09-18 PROCEDURE — 3078F DIAST BP <80 MM HG: CPT

## 2024-09-18 ASSESSMENT — FIBROSIS 4 INDEX: FIB4 SCORE: 0.31

## 2024-09-18 NOTE — TELEPHONE ENCOUNTER
Pt called to see if it is normal to have light pink blood on paper toilet after wiping after getting sweeped

## 2024-09-18 NOTE — PROGRESS NOTES
Pt here today for OBFV   +FM movemnet  No VB, LOF. Uc's   Phone number [phone] (home)   Pharmacy verified   IOL- instructions given to pt   GBS- neg   3rd labs- pt states per Evan she no longer needed to do these.   Pt would like to get a membrane sweep, and would like to see if she can get a work note. Has been having frequent back pains.

## 2024-09-18 NOTE — PROGRESS NOTES
S: Pt is a 24 y.o.  at 37w1d gestation here today for routine prenatal care. Pt reports fetal movement; denies cramping, vaginal bleeding, and leaking of fluid. Reports lots of back pain, she was previously taking Tylenol with no relief. Generally very uncomfortable and wants to deliver ASAP.    Pt insisting on a cervical exam and sweep today. Declining third trimester labs.      O: /60   Wt 256 lb    *see prenatal flowsheet*     A: IUP at 37w1d       S=D         Patient Active Problem List    Diagnosis Date Noted    Anxiety 2024    Encounter for supervision of normal first pregnancy in second trimester 07/15/2024    Hx  loss d/y asphyxiation 07/15/2024    History of postpartum depression 07/15/2024       P:   -Discussed normal s/sx pregnancy appropriate for gestational age general discomforts vs warning signs that necessitate evaluation in OB triage. Reviewed fetal movements appropriate for EGA. Reinforced adequate hydration and nutrition.  -Discussed labor precautions, including 5-1-1 rule. Instructed to report to hospital with leaking of fluid, concerns for decreased FM, excessive vaginal bleeding.   -Discussed signs and symptoms of preeclampsia, including HA that is not resolved by rest/Tylenol/hydration, changes in vision, RUQ pain, or sudden increase in swelling.   -SVE today  -Pt declines third trimester labs, states that she was told by another provider that she did not have to have them completed  -We discussed that we do not typically do membrane sweeps at 37 weeks and that last few weeks of fetal development are important.  Discussed recommendation to wait to do membrane sweep until further gestational age. Pt insisting that I perform one today. V/u of risks. Discussed elective IOL at 39 weeks due to discomforts. She would like an induction sooner, though I told her that we do not offer inductions before 39 weeks unless medically indicated. IOL request previously sent for 39  weeks.  -RTC in 1 weeks for routine prenatal care      Georgia Morin C.N.M.

## 2024-09-24 ENCOUNTER — TELEPHONE (OUTPATIENT)
Dept: OBGYN | Facility: CLINIC | Age: 24
End: 2024-09-24
Payer: COMMERCIAL

## 2024-09-24 ENCOUNTER — HOSPITAL ENCOUNTER (EMERGENCY)
Facility: MEDICAL CENTER | Age: 24
End: 2024-09-24
Attending: STUDENT IN AN ORGANIZED HEALTH CARE EDUCATION/TRAINING PROGRAM | Admitting: STUDENT IN AN ORGANIZED HEALTH CARE EDUCATION/TRAINING PROGRAM
Payer: COMMERCIAL

## 2024-09-24 VITALS — RESPIRATION RATE: 16 BRPM

## 2024-09-24 PROCEDURE — 99284 EMERGENCY DEPT VISIT MOD MDM: CPT

## 2024-09-24 PROCEDURE — 59025 FETAL NON-STRESS TEST: CPT

## 2024-09-24 PROCEDURE — 99283 EMERGENCY DEPT VISIT LOW MDM: CPT | Mod: GC | Performed by: OBSTETRICS & GYNECOLOGY

## 2024-09-24 ASSESSMENT — PAIN SCALES - GENERAL: PAINLEVEL: 4

## 2024-09-24 NOTE — ED PROVIDER NOTES
OB ED EVALUATION NOTE    SUBJECTIVE:  Kristie Luciano is a 24 y.o.,  at 38w0d who presents for decreased fetal movement in the past 2 days, increased low back pain and desires to be induced today. She denies vaginal bleeding, leakage of fluid. She does report irregular contractions. Since arriving, she has felt fetal movement now. She has elective IOL scheduled at 39 weeks.    I personally reviewed the past medical and surgical histories, as well as the problem list.    Hx of infant death at 5mo of age due to asphyxia.    Recent lower back surgery, discectomy, done in November    OBJECTIVE:  Vital Signs:   Vitals:    24 0945   Resp: 16     GEN: NAD  Abd: soft, NT, gravid  Ext: no edema    Pelvic:   SVE: 2/50/-3, unchanged from office visit on   -repeat exam unchanged    NST read: moderate variability, no decels, accelerations with contractions  Lookingglass: having contractions every 5 minutes    LABS / IMAGING:  Results for orders placed or performed during the hospital encounter of 24   GRP B STREP, BY PCR (OLIVARES BROTH)    Specimen: Genital   Result Value Ref Range    Strep Gp B DNA PCR Negative Negative         ASSESSMENT AND PLAN:  24 y.o.    Patient Active Problem List    Diagnosis Date Noted    Anxiety 2024    Encounter for supervision of normal first pregnancy in third trimester 07/15/2024    Hx  loss d/y asphyxiation 07/15/2024    History of postpartum depression 07/15/2024       The patient was instructed to follow up in the office in 1 weeks. She was counseled to call or return for vaginal bleeding, regular contractions, leakage of fluid or decreased fetal movement.    I explained that there is no current indication to induce her early and that she may return for her scheduled induction at 39 weeks. She is not in active labor and her vaginal exam is unchanged for her visit on . She understands and expresses agreement. She is discharged in stable  condition    Tristin Mejia MD  PGY1  [facility]

## 2024-09-24 NOTE — TELEPHONE ENCOUNTER
Pt called stating that her baby has been moving less in the past 2 days. Last night she only got 3 kicks in 2 hours after eating, laying on her left side and trying a cold drink. States that she is not feeling any movement this morning. Advised pt to head to labor and deliver.

## 2024-09-24 NOTE — PROGRESS NOTES
Pt presents to L&D with complaints of decreased FM. Pt ambulated to LDA 1 for assessment.     0822 FHT dopplered    0838 TOCO and EFM applied, VSS. Pt reports decreased FM, denies LOF or VB. Pt states she has noticed a change in FM over the past couple of days. Audible FM heard and felt by pt while placing monitors. Pt reports increased lower back pain and the desire to be induced today. Pt states she was induced at 37 weeks stating no medical reasons were required. Pt educated that unless she was 39 weeks or there was a medical reason pt could request an elective IOL at 39 weeks. Pt reports increased lower back pain as she had back surgery in November and got pregnant shortly after. Pt reports contractions that are 2-3 minutes apart but these have been occurring on and off for a while. SVE 2/50/-3. RN will update MD.     0915 MD at bedside    0930 Per Dr. Mc, if pt would like to be rechecked at the hour sterling ok, if not okay to discharge home prior. Plan for discharge if SVE unchanged.     0945 RN at bedside, pt would like a repeat SVE. SVE unchanged. Labor precautions given and all questions answered.     0953 PT discharged home in stable condition.

## 2024-09-25 ENCOUNTER — APPOINTMENT (OUTPATIENT)
Dept: OBGYN | Facility: CLINIC | Age: 24
End: 2024-09-25
Payer: COMMERCIAL

## 2024-09-26 ENCOUNTER — ROUTINE PRENATAL (OUTPATIENT)
Dept: OBGYN | Facility: CLINIC | Age: 24
End: 2024-09-26
Payer: COMMERCIAL

## 2024-09-26 VITALS — WEIGHT: 258.4 LBS | BODY MASS INDEX: 39.29 KG/M2 | DIASTOLIC BLOOD PRESSURE: 80 MMHG | SYSTOLIC BLOOD PRESSURE: 110 MMHG

## 2024-09-26 DIAGNOSIS — Z34.03 ENCOUNTER FOR SUPERVISION OF NORMAL FIRST PREGNANCY IN THIRD TRIMESTER: ICD-10-CM

## 2024-09-26 PROCEDURE — 0502F SUBSEQUENT PRENATAL CARE: CPT | Performed by: PHYSICIAN ASSISTANT

## 2024-09-26 PROCEDURE — 3079F DIAST BP 80-89 MM HG: CPT | Performed by: PHYSICIAN ASSISTANT

## 2024-09-26 PROCEDURE — 3074F SYST BP LT 130 MM HG: CPT | Performed by: PHYSICIAN ASSISTANT

## 2024-09-26 ASSESSMENT — FIBROSIS 4 INDEX: FIB4 SCORE: 0.31

## 2024-09-26 NOTE — PROGRESS NOTES
Pt here today for OBFV   +FM movemnet  No VB, LOF. 's   Phone number [phone] (home)   Pharmacy verified

## 2024-09-26 NOTE — PROGRESS NOTES
"Pt has no complaints with cramping, current UCs, Vb, LOF. Pt was seen in L&D for decr FM but sent home after reactive NST. Pt has felt more FM, though pt acknowledges baby \"has never met\" the 10 movements over 2 hours. Strongly encourage daily FKC. Labor precautuions reviewed. Cervix: 2-3/75/-2, very post, med, vtx. IOL 10/1 - reviewed with pt.     Pt also notes her last child wasn't circumcised for 2 wk after delivery, so pt adamant she would like immediate circ after delivery in house. Pt experienced stress and she felt more pain with first child due to the delayed circ. Pt is already anxious due to hx loss, so I support this decision if possible. Also, pt was told this baby needed PP fetal echo due to small VSD seen at 20 wk US though not seen at 33wks. Pt would still like fetal echo due to anxiety surrounding loss of first child. RTC 1 wk or sooner prn.  "

## 2024-10-01 ENCOUNTER — APPOINTMENT (OUTPATIENT)
Dept: OBGYN | Facility: MEDICAL CENTER | Age: 24
End: 2024-10-01
Attending: OBSTETRICS & GYNECOLOGY
Payer: COMMERCIAL

## 2024-10-01 ENCOUNTER — HOSPITAL ENCOUNTER (INPATIENT)
Facility: MEDICAL CENTER | Age: 24
LOS: 2 days | End: 2024-10-03
Attending: OBSTETRICS & GYNECOLOGY | Admitting: OBSTETRICS & GYNECOLOGY
Payer: COMMERCIAL

## 2024-10-01 DIAGNOSIS — Z88.6 ALLERGY TO NSAIDS: ICD-10-CM

## 2024-10-01 LAB
BASOPHILS # BLD AUTO: 0.4 % (ref 0–1.8)
BASOPHILS # BLD: 0.04 K/UL (ref 0–0.12)
EOSINOPHIL # BLD AUTO: 0.17 K/UL (ref 0–0.51)
EOSINOPHIL NFR BLD: 1.5 % (ref 0–6.9)
ERYTHROCYTE [DISTWIDTH] IN BLOOD BY AUTOMATED COUNT: 40.2 FL (ref 35.9–50)
HCT VFR BLD AUTO: 41.3 % (ref 37–47)
HGB BLD-MCNC: 14 G/DL (ref 12–16)
HOLDING TUBE BB 8507: NORMAL
IMM GRANULOCYTES # BLD AUTO: 0.09 K/UL (ref 0–0.11)
IMM GRANULOCYTES NFR BLD AUTO: 0.8 % (ref 0–0.9)
LYMPHOCYTES # BLD AUTO: 2.46 K/UL (ref 1–4.8)
LYMPHOCYTES NFR BLD: 22 % (ref 22–41)
MCH RBC QN AUTO: 29 PG (ref 27–33)
MCHC RBC AUTO-ENTMCNC: 33.9 G/DL (ref 32.2–35.5)
MCV RBC AUTO: 85.7 FL (ref 81.4–97.8)
MONOCYTES # BLD AUTO: 0.57 K/UL (ref 0–0.85)
MONOCYTES NFR BLD AUTO: 5.1 % (ref 0–13.4)
NEUTROPHILS # BLD AUTO: 7.85 K/UL (ref 1.82–7.42)
NEUTROPHILS NFR BLD: 70.2 % (ref 44–72)
NRBC # BLD AUTO: 0 K/UL
NRBC BLD-RTO: 0 /100 WBC (ref 0–0.2)
PLATELET # BLD AUTO: 306 K/UL (ref 164–446)
PMV BLD AUTO: 9.8 FL (ref 9–12.9)
RBC # BLD AUTO: 4.82 M/UL (ref 4.2–5.4)
T PALLIDUM AB SER QL IA: NORMAL
WBC # BLD AUTO: 11.2 K/UL (ref 4.8–10.8)

## 2024-10-01 PROCEDURE — 700111 HCHG RX REV CODE 636 W/ 250 OVERRIDE (IP): Mod: JZ | Performed by: OBSTETRICS & GYNECOLOGY

## 2024-10-01 PROCEDURE — 770002 HCHG ROOM/CARE - OB PRIVATE (112)

## 2024-10-01 PROCEDURE — 36415 COLL VENOUS BLD VENIPUNCTURE: CPT

## 2024-10-01 PROCEDURE — 700105 HCHG RX REV CODE 258: Performed by: OBSTETRICS & GYNECOLOGY

## 2024-10-01 PROCEDURE — 86780 TREPONEMA PALLIDUM: CPT

## 2024-10-01 PROCEDURE — 85025 COMPLETE CBC W/AUTO DIFF WBC: CPT

## 2024-10-01 RX ORDER — ONDANSETRON 4 MG/1
4 TABLET, ORALLY DISINTEGRATING ORAL EVERY 6 HOURS PRN
Status: DISCONTINUED | OUTPATIENT
Start: 2024-10-01 | End: 2024-10-02 | Stop reason: HOSPADM

## 2024-10-01 RX ORDER — ACETAMINOPHEN 500 MG
1000 TABLET ORAL
Status: COMPLETED | OUTPATIENT
Start: 2024-10-01 | End: 2024-10-02

## 2024-10-01 RX ORDER — TERBUTALINE SULFATE 1 MG/ML
0.25 INJECTION, SOLUTION SUBCUTANEOUS
Status: DISCONTINUED | OUTPATIENT
Start: 2024-10-01 | End: 2024-10-02 | Stop reason: HOSPADM

## 2024-10-01 RX ORDER — SODIUM CHLORIDE, SODIUM LACTATE, POTASSIUM CHLORIDE, CALCIUM CHLORIDE 600; 310; 30; 20 MG/100ML; MG/100ML; MG/100ML; MG/100ML
INJECTION, SOLUTION INTRAVENOUS CONTINUOUS
Status: DISCONTINUED | OUTPATIENT
Start: 2024-10-01 | End: 2024-10-02

## 2024-10-01 RX ORDER — ONDANSETRON 2 MG/ML
4 INJECTION INTRAMUSCULAR; INTRAVENOUS EVERY 6 HOURS PRN
Status: DISCONTINUED | OUTPATIENT
Start: 2024-10-01 | End: 2024-10-02 | Stop reason: HOSPADM

## 2024-10-01 RX ORDER — LIDOCAINE HYDROCHLORIDE 10 MG/ML
20 INJECTION, SOLUTION INFILTRATION; PERINEURAL
Status: DISCONTINUED | OUTPATIENT
Start: 2024-10-01 | End: 2024-10-02 | Stop reason: HOSPADM

## 2024-10-01 RX ORDER — HYDROXYZINE HYDROCHLORIDE 25 MG/1
25 TABLET, FILM COATED ORAL EVERY 6 HOURS PRN
Status: DISCONTINUED | OUTPATIENT
Start: 2024-10-01 | End: 2024-10-02 | Stop reason: HOSPADM

## 2024-10-01 RX ORDER — OXYTOCIN 10 [USP'U]/ML
10 INJECTION, SOLUTION INTRAMUSCULAR; INTRAVENOUS
Status: COMPLETED | OUTPATIENT
Start: 2024-10-01 | End: 2024-10-02

## 2024-10-01 RX ORDER — ALUMINA, MAGNESIA, AND SIMETHICONE 2400; 2400; 240 MG/30ML; MG/30ML; MG/30ML
30 SUSPENSION ORAL EVERY 6 HOURS PRN
Status: DISCONTINUED | OUTPATIENT
Start: 2024-10-01 | End: 2024-10-02 | Stop reason: HOSPADM

## 2024-10-01 RX ORDER — METHYLERGONOVINE MALEATE 0.2 MG/ML
0.2 INJECTION INTRAVENOUS
Status: DISCONTINUED | OUTPATIENT
Start: 2024-10-01 | End: 2024-10-02 | Stop reason: HOSPADM

## 2024-10-01 RX ORDER — MISOPROSTOL 200 UG/1
800 TABLET ORAL
Status: DISCONTINUED | OUTPATIENT
Start: 2024-10-01 | End: 2024-10-02 | Stop reason: HOSPADM

## 2024-10-01 RX ADMIN — SODIUM CHLORIDE, POTASSIUM CHLORIDE, SODIUM LACTATE AND CALCIUM CHLORIDE: 600; 310; 30; 20 INJECTION, SOLUTION INTRAVENOUS at 22:28

## 2024-10-01 RX ADMIN — OXYTOCIN 2 MILLI-UNITS/MIN: 10 INJECTION, SOLUTION INTRAMUSCULAR; INTRAVENOUS at 22:29

## 2024-10-01 SDOH — ECONOMIC STABILITY: TRANSPORTATION INSECURITY
IN THE PAST 12 MONTHS, HAS THE LACK OF TRANSPORTATION KEPT YOU FROM MEDICAL APPOINTMENTS OR FROM GETTING MEDICATIONS?: NO

## 2024-10-01 SDOH — ECONOMIC STABILITY: TRANSPORTATION INSECURITY
IN THE PAST 12 MONTHS, HAS LACK OF RELIABLE TRANSPORTATION KEPT YOU FROM MEDICAL APPOINTMENTS, MEETINGS, WORK OR FROM GETTING THINGS NEEDED FOR DAILY LIVING?: NO

## 2024-10-01 ASSESSMENT — FIBROSIS 4 INDEX: FIB4 SCORE: 0.31

## 2024-10-01 ASSESSMENT — SOCIAL DETERMINANTS OF HEALTH (SDOH)
WITHIN THE PAST 12 MONTHS, YOU WORRIED THAT YOUR FOOD WOULD RUN OUT BEFORE YOU GOT THE MONEY TO BUY MORE: NEVER TRUE
IN THE PAST 12 MONTHS, HAS THE ELECTRIC, GAS, OIL, OR WATER COMPANY THREATENED TO SHUT OFF SERVICE IN YOUR HOME?: NO
WITHIN THE PAST 12 MONTHS, THE FOOD YOU BOUGHT JUST DIDN'T LAST AND YOU DIDN'T HAVE MONEY TO GET MORE: NEVER TRUE

## 2024-10-01 ASSESSMENT — PATIENT HEALTH QUESTIONNAIRE - PHQ9
SUM OF ALL RESPONSES TO PHQ9 QUESTIONS 1 AND 2: 0
1. LITTLE INTEREST OR PLEASURE IN DOING THINGS: NOT AT ALL
2. FEELING DOWN, DEPRESSED, IRRITABLE, OR HOPELESS: NOT AT ALL

## 2024-10-02 ENCOUNTER — ANESTHESIA (OUTPATIENT)
Dept: ANESTHESIOLOGY | Facility: MEDICAL CENTER | Age: 24
End: 2024-10-02
Payer: COMMERCIAL

## 2024-10-02 ENCOUNTER — ANESTHESIA EVENT (OUTPATIENT)
Dept: ANESTHESIOLOGY | Facility: MEDICAL CENTER | Age: 24
End: 2024-10-02
Payer: COMMERCIAL

## 2024-10-02 PROCEDURE — 700102 HCHG RX REV CODE 250 W/ 637 OVERRIDE(OP): Performed by: PHYSICIAN ASSISTANT

## 2024-10-02 PROCEDURE — 700101 HCHG RX REV CODE 250: Performed by: STUDENT IN AN ORGANIZED HEALTH CARE EDUCATION/TRAINING PROGRAM

## 2024-10-02 PROCEDURE — 10907ZC DRAINAGE OF AMNIOTIC FLUID, THERAPEUTIC FROM PRODUCTS OF CONCEPTION, VIA NATURAL OR ARTIFICIAL OPENING: ICD-10-PCS

## 2024-10-02 PROCEDURE — 59409 OBSTETRICAL CARE: CPT

## 2024-10-02 PROCEDURE — A9270 NON-COVERED ITEM OR SERVICE: HCPCS | Performed by: PHYSICIAN ASSISTANT

## 2024-10-02 PROCEDURE — 700102 HCHG RX REV CODE 250 W/ 637 OVERRIDE(OP): Performed by: OBSTETRICS & GYNECOLOGY

## 2024-10-02 PROCEDURE — 700111 HCHG RX REV CODE 636 W/ 250 OVERRIDE (IP): Performed by: STUDENT IN AN ORGANIZED HEALTH CARE EDUCATION/TRAINING PROGRAM

## 2024-10-02 PROCEDURE — 700111 HCHG RX REV CODE 636 W/ 250 OVERRIDE (IP): Mod: JZ | Performed by: OBSTETRICS & GYNECOLOGY

## 2024-10-02 PROCEDURE — A9270 NON-COVERED ITEM OR SERVICE: HCPCS | Performed by: OBSTETRICS & GYNECOLOGY

## 2024-10-02 PROCEDURE — 303615 HCHG EPIDURAL/SPINAL ANESTHESIA FOR LABOR

## 2024-10-02 PROCEDURE — 700105 HCHG RX REV CODE 258: Performed by: OBSTETRICS & GYNECOLOGY

## 2024-10-02 PROCEDURE — 59400 OBSTETRICAL CARE: CPT | Performed by: PHYSICIAN ASSISTANT

## 2024-10-02 PROCEDURE — 770002 HCHG ROOM/CARE - OB PRIVATE (112)

## 2024-10-02 PROCEDURE — 304965 HCHG RECOVERY SERVICES

## 2024-10-02 PROCEDURE — 0HQ9XZZ REPAIR PERINEUM SKIN, EXTERNAL APPROACH: ICD-10-PCS | Performed by: PHYSICIAN ASSISTANT

## 2024-10-02 RX ORDER — OXYCODONE HYDROCHLORIDE 5 MG/1
5 TABLET ORAL EVERY 4 HOURS PRN
Status: DISCONTINUED | OUTPATIENT
Start: 2024-10-02 | End: 2024-10-03 | Stop reason: HOSPADM

## 2024-10-02 RX ORDER — SODIUM CHLORIDE, SODIUM LACTATE, POTASSIUM CHLORIDE, AND CALCIUM CHLORIDE .6; .31; .03; .02 G/100ML; G/100ML; G/100ML; G/100ML
1000 INJECTION, SOLUTION INTRAVENOUS
Status: DISCONTINUED | OUTPATIENT
Start: 2024-10-02 | End: 2024-10-02 | Stop reason: HOSPADM

## 2024-10-02 RX ORDER — SODIUM CHLORIDE, SODIUM LACTATE, POTASSIUM CHLORIDE, CALCIUM CHLORIDE 600; 310; 30; 20 MG/100ML; MG/100ML; MG/100ML; MG/100ML
INJECTION, SOLUTION INTRAVENOUS PRN
Status: DISCONTINUED | OUTPATIENT
Start: 2024-10-02 | End: 2024-10-03 | Stop reason: HOSPADM

## 2024-10-02 RX ORDER — ACETAMINOPHEN 500 MG
1000 TABLET ORAL EVERY 6 HOURS PRN
Status: DISCONTINUED | OUTPATIENT
Start: 2024-10-02 | End: 2024-10-03 | Stop reason: HOSPADM

## 2024-10-02 RX ORDER — EPHEDRINE SULFATE 50 MG/ML
5 INJECTION, SOLUTION INTRAVENOUS
Status: DISCONTINUED | OUTPATIENT
Start: 2024-10-02 | End: 2024-10-02 | Stop reason: HOSPADM

## 2024-10-02 RX ORDER — MISOPROSTOL 200 UG/1
600 TABLET ORAL
Status: DISCONTINUED | OUTPATIENT
Start: 2024-10-02 | End: 2024-10-03 | Stop reason: HOSPADM

## 2024-10-02 RX ORDER — DOCUSATE SODIUM 100 MG/1
100 CAPSULE, LIQUID FILLED ORAL 2 TIMES DAILY PRN
Status: DISCONTINUED | OUTPATIENT
Start: 2024-10-02 | End: 2024-10-03 | Stop reason: HOSPADM

## 2024-10-02 RX ORDER — ALBUTEROL SULFATE 90 UG/1
2 INHALANT RESPIRATORY (INHALATION) EVERY 4 HOURS PRN
Status: DISCONTINUED | OUTPATIENT
Start: 2024-10-02 | End: 2024-10-03 | Stop reason: HOSPADM

## 2024-10-02 RX ORDER — LIDOCAINE HYDROCHLORIDE 10 MG/ML
INJECTION, SOLUTION EPIDURAL; INFILTRATION; INTRACAUDAL; PERINEURAL PRN
Status: DISCONTINUED | OUTPATIENT
Start: 2024-10-02 | End: 2024-10-02 | Stop reason: SURG

## 2024-10-02 RX ORDER — BUPIVACAINE HYDROCHLORIDE 2.5 MG/ML
INJECTION, SOLUTION EPIDURAL; INFILTRATION; INTRACAUDAL PRN
Status: DISCONTINUED | OUTPATIENT
Start: 2024-10-02 | End: 2024-10-02 | Stop reason: SURG

## 2024-10-02 RX ORDER — BUPIVACAINE HYDROCHLORIDE 2.5 MG/ML
INJECTION, SOLUTION EPIDURAL; INFILTRATION; INTRACAUDAL
Status: COMPLETED
Start: 2024-10-02 | End: 2024-10-02

## 2024-10-02 RX ORDER — ROPIVACAINE HYDROCHLORIDE 2 MG/ML
INJECTION, SOLUTION EPIDURAL; INFILTRATION; PERINEURAL CONTINUOUS
Status: DISCONTINUED | OUTPATIENT
Start: 2024-10-02 | End: 2024-10-02

## 2024-10-02 RX ORDER — SODIUM CHLORIDE, SODIUM LACTATE, POTASSIUM CHLORIDE, AND CALCIUM CHLORIDE .6; .31; .03; .02 G/100ML; G/100ML; G/100ML; G/100ML
250 INJECTION, SOLUTION INTRAVENOUS PRN
Status: DISCONTINUED | OUTPATIENT
Start: 2024-10-02 | End: 2024-10-02 | Stop reason: HOSPADM

## 2024-10-02 RX ADMIN — OXYTOCIN 10 UNITS: 10 INJECTION, SOLUTION INTRAMUSCULAR; INTRAVENOUS at 10:42

## 2024-10-02 RX ADMIN — BUPIVACAINE HYDROCHLORIDE 8 ML: 2.5 INJECTION, SOLUTION EPIDURAL; INFILTRATION; INTRACAUDAL at 03:16

## 2024-10-02 RX ADMIN — LIDOCAINE HYDROCHLORIDE,EPINEPHRINE BITARTRATE 3 ML: 15; .005 INJECTION, SOLUTION EPIDURAL; INFILTRATION; INTRACAUDAL; PERINEURAL at 03:10

## 2024-10-02 RX ADMIN — LIDOCAINE HYDROCHLORIDE 30 MG: 10 INJECTION, SOLUTION EPIDURAL; INFILTRATION; INTRACAUDAL; PERINEURAL at 03:07

## 2024-10-02 RX ADMIN — EPHEDRINE SULFATE 5 MG: 50 INJECTION, SOLUTION INTRAVENOUS at 05:58

## 2024-10-02 RX ADMIN — ACETAMINOPHEN 1000 MG: 500 TABLET ORAL at 11:56

## 2024-10-02 RX ADMIN — SODIUM CHLORIDE, POTASSIUM CHLORIDE, SODIUM LACTATE AND CALCIUM CHLORIDE: 600; 310; 30; 20 INJECTION, SOLUTION INTRAVENOUS at 09:22

## 2024-10-02 RX ADMIN — ACETAMINOPHEN 1000 MG: 500 TABLET ORAL at 18:18

## 2024-10-02 RX ADMIN — OXYCODONE HYDROCHLORIDE 5 MG: 5 TABLET ORAL at 19:52

## 2024-10-02 RX ADMIN — ONDANSETRON 4 MG: 2 INJECTION INTRAMUSCULAR; INTRAVENOUS at 04:18

## 2024-10-02 RX ADMIN — ROPIVACAINE HYDROCHLORIDE: 2 INJECTION EPIDURAL; INFILTRATION; PERINEURAL at 03:38

## 2024-10-02 RX ADMIN — OXYCODONE HYDROCHLORIDE 5 MG: 5 TABLET ORAL at 14:54

## 2024-10-02 ASSESSMENT — PAIN DESCRIPTION - PAIN TYPE
TYPE: ACUTE PAIN

## 2024-10-02 ASSESSMENT — PAIN SCALES - GENERAL: PAIN_LEVEL: 1

## 2024-10-03 ENCOUNTER — TELEPHONE (OUTPATIENT)
Dept: OBGYN | Facility: CLINIC | Age: 24
End: 2024-10-03

## 2024-10-03 ENCOUNTER — APPOINTMENT (OUTPATIENT)
Dept: OBGYN | Facility: CLINIC | Age: 24
End: 2024-10-03
Payer: COMMERCIAL

## 2024-10-03 ENCOUNTER — PHARMACY VISIT (OUTPATIENT)
Dept: PHARMACY | Facility: MEDICAL CENTER | Age: 24
End: 2024-10-03
Payer: MEDICARE

## 2024-10-03 VITALS
SYSTOLIC BLOOD PRESSURE: 122 MMHG | HEART RATE: 86 BPM | WEIGHT: 258 LBS | HEIGHT: 68 IN | OXYGEN SATURATION: 96 % | RESPIRATION RATE: 18 BRPM | BODY MASS INDEX: 39.1 KG/M2 | TEMPERATURE: 97.3 F | DIASTOLIC BLOOD PRESSURE: 78 MMHG

## 2024-10-03 PROBLEM — Z34.03 ENCOUNTER FOR SUPERVISION OF NORMAL FIRST PREGNANCY IN THIRD TRIMESTER: Status: RESOLVED | Noted: 2024-07-15 | Resolved: 2024-10-03

## 2024-10-03 LAB
BASOPHILS # BLD AUTO: 0.7 % (ref 0–1.8)
BASOPHILS # BLD: 0.07 K/UL (ref 0–0.12)
EOSINOPHIL # BLD AUTO: 0.23 K/UL (ref 0–0.51)
EOSINOPHIL NFR BLD: 2.2 % (ref 0–6.9)
ERYTHROCYTE [DISTWIDTH] IN BLOOD BY AUTOMATED COUNT: 39.7 FL (ref 35.9–50)
HCT VFR BLD AUTO: 37.5 % (ref 37–47)
HGB BLD-MCNC: 12.4 G/DL (ref 12–16)
IMM GRANULOCYTES # BLD AUTO: 0.1 K/UL (ref 0–0.11)
IMM GRANULOCYTES NFR BLD AUTO: 1 % (ref 0–0.9)
LYMPHOCYTES # BLD AUTO: 2.67 K/UL (ref 1–4.8)
LYMPHOCYTES NFR BLD: 25.7 % (ref 22–41)
MCH RBC QN AUTO: 28.2 PG (ref 27–33)
MCHC RBC AUTO-ENTMCNC: 33.1 G/DL (ref 32.2–35.5)
MCV RBC AUTO: 85.4 FL (ref 81.4–97.8)
MONOCYTES # BLD AUTO: 0.58 K/UL (ref 0–0.85)
MONOCYTES NFR BLD AUTO: 5.6 % (ref 0–13.4)
NEUTROPHILS # BLD AUTO: 6.73 K/UL (ref 1.82–7.42)
NEUTROPHILS NFR BLD: 64.8 % (ref 44–72)
NRBC # BLD AUTO: 0 K/UL
NRBC BLD-RTO: 0 /100 WBC (ref 0–0.2)
PLATELET # BLD AUTO: 247 K/UL (ref 164–446)
PMV BLD AUTO: 9.7 FL (ref 9–12.9)
RBC # BLD AUTO: 4.39 M/UL (ref 4.2–5.4)
WBC # BLD AUTO: 10.4 K/UL (ref 4.8–10.8)

## 2024-10-03 PROCEDURE — 700102 HCHG RX REV CODE 250 W/ 637 OVERRIDE(OP): Performed by: PHYSICIAN ASSISTANT

## 2024-10-03 PROCEDURE — A9270 NON-COVERED ITEM OR SERVICE: HCPCS | Performed by: PHYSICIAN ASSISTANT

## 2024-10-03 PROCEDURE — 85025 COMPLETE CBC W/AUTO DIFF WBC: CPT

## 2024-10-03 PROCEDURE — RXMED WILLOW AMBULATORY MEDICATION CHARGE: Performed by: MIDWIFE

## 2024-10-03 PROCEDURE — 36415 COLL VENOUS BLD VENIPUNCTURE: CPT

## 2024-10-03 RX ORDER — ACETAMINOPHEN 500 MG
1000 TABLET ORAL EVERY 6 HOURS PRN
Qty: 30 TABLET | Refills: 0 | Status: SHIPPED | OUTPATIENT
Start: 2024-10-03

## 2024-10-03 RX ORDER — OXYCODONE HYDROCHLORIDE 5 MG/1
5 TABLET ORAL EVERY 4 HOURS PRN
Qty: 15 TABLET | Refills: 0 | Status: SHIPPED | OUTPATIENT
Start: 2024-10-03 | End: 2024-10-10

## 2024-10-03 RX ORDER — PSEUDOEPHEDRINE HCL 30 MG
100 TABLET ORAL 2 TIMES DAILY PRN
Qty: 60 CAPSULE | Refills: 0 | Status: SHIPPED | OUTPATIENT
Start: 2024-10-03 | End: 2024-10-17

## 2024-10-03 RX ADMIN — DOCUSATE SODIUM 100 MG: 100 CAPSULE, LIQUID FILLED ORAL at 10:42

## 2024-10-03 RX ADMIN — ACETAMINOPHEN 1000 MG: 500 TABLET ORAL at 08:34

## 2024-10-03 RX ADMIN — OXYCODONE HYDROCHLORIDE 5 MG: 5 TABLET ORAL at 06:19

## 2024-10-03 RX ADMIN — OXYCODONE HYDROCHLORIDE 5 MG: 5 TABLET ORAL at 10:42

## 2024-10-03 RX ADMIN — OXYCODONE HYDROCHLORIDE 5 MG: 5 TABLET ORAL at 01:15

## 2024-10-03 RX ADMIN — ACETAMINOPHEN 1000 MG: 500 TABLET ORAL at 00:19

## 2024-10-03 ASSESSMENT — PAIN DESCRIPTION - PAIN TYPE
TYPE: ACUTE PAIN

## 2024-10-03 ASSESSMENT — EDINBURGH POSTNATAL DEPRESSION SCALE (EPDS)
I HAVE BEEN SO UNHAPPY THAT I HAVE HAD DIFFICULTY SLEEPING: NOT VERY OFTEN
I HAVE BEEN ABLE TO LAUGH AND SEE THE FUNNY SIDE OF THINGS: AS MUCH AS I ALWAYS COULD
THE THOUGHT OF HARMING MYSELF HAS OCCURRED TO ME: NEVER
I HAVE FELT SAD OR MISERABLE: NOT VERY OFTEN
I HAVE BEEN SO UNHAPPY THAT I HAVE BEEN CRYING: NO, NEVER
I HAVE LOOKED FORWARD WITH ENJOYMENT TO THINGS: AS MUCH AS I EVER DID
I HAVE BEEN ANXIOUS OR WORRIED FOR NO GOOD REASON: YES, SOMETIMES
I HAVE FELT SCARED OR PANICKY FOR NO GOOD REASON: YES, SOMETIMES
I HAVE BLAMED MYSELF UNNECESSARILY WHEN THINGS WENT WRONG: NOT VERY OFTEN
THINGS HAVE BEEN GETTING ON TOP OF ME: NO, MOST OF THE TIME I HAVE COPED QUITE WELL

## 2025-02-15 ENCOUNTER — HOSPITAL ENCOUNTER (EMERGENCY)
Facility: MEDICAL CENTER | Age: 25
End: 2025-02-15
Attending: EMERGENCY MEDICINE
Payer: COMMERCIAL

## 2025-02-15 VITALS
HEIGHT: 68 IN | OXYGEN SATURATION: 98 % | TEMPERATURE: 97.5 F | SYSTOLIC BLOOD PRESSURE: 139 MMHG | BODY MASS INDEX: 38.06 KG/M2 | HEART RATE: 87 BPM | WEIGHT: 251.1 LBS | DIASTOLIC BLOOD PRESSURE: 70 MMHG | RESPIRATION RATE: 16 BRPM

## 2025-02-15 DIAGNOSIS — K08.89 PAIN, DENTAL: ICD-10-CM

## 2025-02-15 PROCEDURE — 700102 HCHG RX REV CODE 250 W/ 637 OVERRIDE(OP): Mod: UD | Performed by: EMERGENCY MEDICINE

## 2025-02-15 PROCEDURE — A9270 NON-COVERED ITEM OR SERVICE: HCPCS | Mod: UD | Performed by: EMERGENCY MEDICINE

## 2025-02-15 PROCEDURE — 99283 EMERGENCY DEPT VISIT LOW MDM: CPT

## 2025-02-15 RX ORDER — PENICILLIN V POTASSIUM 500 MG/1
500 TABLET, FILM COATED ORAL 4 TIMES DAILY
Qty: 40 TABLET | Refills: 0 | Status: ACTIVE | OUTPATIENT
Start: 2025-02-15

## 2025-02-15 RX ORDER — HYDROCODONE BITARTRATE AND ACETAMINOPHEN 5; 325 MG/1; MG/1
1 TABLET ORAL ONCE
Refills: 0 | Status: COMPLETED | OUTPATIENT
Start: 2025-02-15 | End: 2025-02-15

## 2025-02-15 RX ORDER — PENICILLIN V POTASSIUM 500 MG/1
500 TABLET, FILM COATED ORAL ONCE
Status: COMPLETED | OUTPATIENT
Start: 2025-02-15 | End: 2025-02-15

## 2025-02-15 RX ORDER — TRAMADOL HYDROCHLORIDE 50 MG/1
50 TABLET ORAL EVERY 6 HOURS PRN
Qty: 12 TABLET | Refills: 0 | Status: SHIPPED | OUTPATIENT
Start: 2025-02-15 | End: 2025-02-19

## 2025-02-15 RX ADMIN — HYDROCODONE BITARTRATE AND ACETAMINOPHEN 1 TABLET: 5; 325 TABLET ORAL at 17:19

## 2025-02-15 RX ADMIN — PENICILLIN V POTASSIUM 500 MG: 500 TABLET, FILM COATED ORAL at 17:19

## 2025-02-15 ASSESSMENT — LIFESTYLE VARIABLES: DO YOU DRINK ALCOHOL: NO

## 2025-02-15 ASSESSMENT — FIBROSIS 4 INDEX: FIB4 SCORE: 0.41

## 2025-02-15 NOTE — Clinical Note
Kristie Luciano was seen and treated in our emergency department on 2/15/2025.  She may return to work on 02/19/2025.       If you have any questions or concerns, please don't hesitate to call.      Carlton Sebastian D.O.

## 2025-02-15 NOTE — Clinical Note
Kristie Luciano was seen and treated in our emergency department on 2/15/2025.  She may return to work on 02/17/2025.       If you have any questions or concerns, please don't hesitate to call.      Carlton Sebastian D.O.

## 2025-02-15 NOTE — ED TRIAGE NOTES
Kristie Tanneroraturner [facility]  25 y.o. female  Chief Complaint   Patient presents with    Tooth Ache     Pt reports broken top back molar       Pt amb to triage with steady gait for above complaint. Pt reports she is to have her tooth removed on Tuesday.  Pt is alert and oriented, speaking in full sentences, follows commands and responds appropriately to questions. Not in any apparent distress. Respirations are even and unlabored.  Pt placed in ED Lobby. Pt educated on triage process. Pt encouraged to alert staff for any changes.

## 2025-02-16 NOTE — ED PROVIDER NOTES
ED Provider Note    CHIEF COMPLAINT  Chief Complaint   Patient presents with    Tooth Ache     Pt reports broken top back molar       EXTERNAL RECORDS REVIEWED  Outpatient Notes none    HPI/ROS  LIMITATION TO HISTORY   none  OUTSIDE HISTORIAN(S):  none    Kristie Renetta Luciano is a 25 y.o. female who presents here for evaluation of toothache.  Patient states that she has a back molar that is broken, and has been causing her pain.  She has no fever chills or vomiting, she has no current dentist.  Patient has no facial swelling or redness.  Patient is able to eat and drink as tolerated.    PAST MEDICAL HISTORY   has a past medical history of Asthma.    SURGICAL HISTORY   has a past surgical history that includes lumbar laminectomy diskectomy (Left, 2023).    FAMILY HISTORY  Family History   Problem Relation Age of Onset    Alzheimer's Disease Maternal Grandmother     Diabetes Paternal Grandmother     Kidney Disease Paternal Grandfather         Kidney Transplant (alcohol)       SOCIAL HISTORY  Social History     Tobacco Use    Smoking status: Former     Current packs/day: 0.00     Types: Cigarettes     Quit date: 2024     Years since quittin.1    Smokeless tobacco: Former    Tobacco comments:     Vap tobacco products    Vaping Use    Vaping status: Former   Substance and Sexual Activity    Alcohol use: Not Currently    Drug use: Not Currently     Types: Marijuana    Sexual activity: Yes     Partners: Male       CURRENT MEDICATIONS  Home Medications       Reviewed by Angel Moscoso R.N. (Registered Nurse) on 02/15/25 at 1558  Med List Status: Not Addressed     Medication Last Dose Status   acetaminophen (TYLENOL) 500 MG Tab  Active   albuterol 108 (90 Base) MCG/ACT Aero Soln inhalation aerosol  Active   Prenatal MV-Min-Fe Fum-FA-DHA (PRENATAL 1 PO)  Active                    ALLERGIES  Allergies   Allergen Reactions    Nsaids Hives       PHYSICAL EXAM  VITAL SIGNS: /80   Pulse 97   Temp  "37.1 °C (98.7 °F) (Temporal)   Resp 18   Ht 1.727 m (5' 8\")   Wt 114 kg (251 lb 1.7 oz)   LMP 01/20/2025 (Exact Date)   SpO2 96%   BMI 38.18 kg/m²    Constitutional: Well developed, well nourished. No acute distress.  HEENT: Normocephalic, atraumatic. Posterior pharynx clear and moist.  Poor dentition, no abscess  Eyes:  EOMI. Normal sclera.  Chest; no respiratory distress, equal rise and fall  Neck; full range of motion, no meningeal signs  Musculoskeletal: No deformity, no edema, neurovascular intact.   Neuro: Clear speech, appropriate, cooperative, cranial nerves II-XII grossly intact.  Psych: Normal mood and affect  Skin; warm and dry, no petechial rash      EKG/LABS  None    RADIOLOGY/PROCEDURES   None      COURSE & MEDICAL DECISION MAKING    ASSESSMENT, COURSE AND PLAN  Care Narrative: This is a 21-year-old female here for evaluation of dental pain.  Patient placed on antibiotics and comes in for pain, and will follow-up as outpatient.  She is nontoxic and afebrile comfortable the plan.  She is tolerating p.o. as well.  Patient has upcoming dental appointment on Tuesday.  She was sent here for antibiotics.      DISPOSITION AND DISCUSSIONS  I have discussed management of the patient with the following physicians and MIKEY's: None    Discussion of management with other QHP or appropriate source(s): None    Escalation of care considered, and ultimately not performed: None    Barriers to care at this time, including but not limited to: None.     Decision tools and prescription drugs considered including, but not limited to: None.    FINAL DIAGNOSIS  Dental pain     Electronically signed by: Carlton Sebastian D.O., 2/15/2025 4:53 PM      "

## 2025-08-21 ENCOUNTER — APPOINTMENT (OUTPATIENT)
Dept: RADIOLOGY | Facility: MEDICAL CENTER | Age: 25
End: 2025-08-21
Attending: EMERGENCY MEDICINE
Payer: MEDICAID

## 2025-08-21 ENCOUNTER — HOSPITAL ENCOUNTER (EMERGENCY)
Facility: MEDICAL CENTER | Age: 25
End: 2025-08-21
Attending: EMERGENCY MEDICINE
Payer: MEDICAID

## 2025-08-21 ENCOUNTER — PHARMACY VISIT (OUTPATIENT)
Dept: PHARMACY | Facility: MEDICAL CENTER | Age: 25
End: 2025-08-21
Payer: COMMERCIAL

## 2025-08-21 VITALS
BODY MASS INDEX: 40.16 KG/M2 | HEART RATE: 73 BPM | RESPIRATION RATE: 18 BRPM | HEIGHT: 68 IN | OXYGEN SATURATION: 97 % | DIASTOLIC BLOOD PRESSURE: 75 MMHG | TEMPERATURE: 96.7 F | SYSTOLIC BLOOD PRESSURE: 121 MMHG | WEIGHT: 264.99 LBS

## 2025-08-21 DIAGNOSIS — Z3A.08 8 WEEKS GESTATION OF PREGNANCY: ICD-10-CM

## 2025-08-21 DIAGNOSIS — R11.0 NAUSEA: ICD-10-CM

## 2025-08-21 DIAGNOSIS — R10.9 ABDOMINAL CRAMPING: ICD-10-CM

## 2025-08-21 DIAGNOSIS — N39.0 ACUTE UTI: ICD-10-CM

## 2025-08-21 DIAGNOSIS — N93.9 VAGINAL BLEEDING: Primary | ICD-10-CM

## 2025-08-21 LAB
ALBUMIN SERPL BCP-MCNC: 4.1 G/DL (ref 3.2–4.9)
ALBUMIN/GLOB SERPL: 1.3 G/DL
ALP SERPL-CCNC: 67 U/L (ref 30–99)
ALT SERPL-CCNC: 28 U/L (ref 2–50)
ANION GAP SERPL CALC-SCNC: 11 MMOL/L (ref 7–16)
APPEARANCE UR: CLEAR
AST SERPL-CCNC: 26 U/L (ref 12–45)
B-HCG SERPL-ACNC: ABNORMAL MIU/ML (ref 0–5)
BACTERIA #/AREA URNS HPF: ABNORMAL /HPF
BASOPHILS # BLD AUTO: 0.5 % (ref 0–1.8)
BASOPHILS # BLD: 0.05 K/UL (ref 0–0.12)
BILIRUB SERPL-MCNC: 0.3 MG/DL (ref 0.1–1.5)
BILIRUB UR QL STRIP.AUTO: NEGATIVE
BUN SERPL-MCNC: 5 MG/DL (ref 8–22)
C TRACH DNA SPEC QL NAA+PROBE: NEGATIVE
CALCIUM ALBUM COR SERPL-MCNC: 9.1 MG/DL (ref 8.5–10.5)
CALCIUM SERPL-MCNC: 9.2 MG/DL (ref 8.5–10.5)
CANDIDA DNA VAG QL PROBE+SIG AMP: NEGATIVE
CASTS URNS QL MICRO: ABNORMAL /LPF (ref 0–2)
CHLORIDE SERPL-SCNC: 105 MMOL/L (ref 96–112)
CO2 SERPL-SCNC: 19 MMOL/L (ref 20–33)
COLOR UR: YELLOW
CREAT SERPL-MCNC: 0.63 MG/DL (ref 0.5–1.4)
EOSINOPHIL # BLD AUTO: 0.3 K/UL (ref 0–0.51)
EOSINOPHIL NFR BLD: 3.3 % (ref 0–6.9)
EPITHELIAL CELLS 1715: ABNORMAL /HPF (ref 0–5)
ERYTHROCYTE [DISTWIDTH] IN BLOOD BY AUTOMATED COUNT: 40.4 FL (ref 35.9–50)
G VAGINALIS DNA VAG QL PROBE+SIG AMP: NEGATIVE
GFR SERPLBLD CREATININE-BSD FMLA CKD-EPI: 126 ML/MIN/1.73 M 2
GLOBULIN SER CALC-MCNC: 3.1 G/DL (ref 1.9–3.5)
GLUCOSE SERPL-MCNC: 97 MG/DL (ref 65–99)
GLUCOSE UR STRIP.AUTO-MCNC: NEGATIVE MG/DL
HCT VFR BLD AUTO: 46.8 % (ref 37–47)
HGB BLD-MCNC: 15.7 G/DL (ref 12–16)
IMM GRANULOCYTES # BLD AUTO: 0.04 K/UL (ref 0–0.11)
IMM GRANULOCYTES NFR BLD AUTO: 0.4 % (ref 0–0.9)
KETONES UR STRIP.AUTO-MCNC: ABNORMAL MG/DL
LEUKOCYTE ESTERASE UR QL STRIP.AUTO: ABNORMAL
LYMPHOCYTES # BLD AUTO: 2.42 K/UL (ref 1–4.8)
LYMPHOCYTES NFR BLD: 26.4 % (ref 22–41)
MCH RBC QN AUTO: 28.6 PG (ref 27–33)
MCHC RBC AUTO-ENTMCNC: 33.5 G/DL (ref 32.2–35.5)
MCV RBC AUTO: 85.2 FL (ref 81.4–97.8)
MICRO URNS: ABNORMAL
MONOCYTES # BLD AUTO: 0.54 K/UL (ref 0–0.85)
MONOCYTES NFR BLD AUTO: 5.9 % (ref 0–13.4)
N GONORRHOEA DNA SPEC QL NAA+PROBE: NEGATIVE
NEUTROPHILS # BLD AUTO: 5.8 K/UL (ref 1.82–7.42)
NEUTROPHILS NFR BLD: 63.5 % (ref 44–72)
NITRITE UR QL STRIP.AUTO: NEGATIVE
NRBC # BLD AUTO: 0 K/UL
NRBC BLD-RTO: 0 /100 WBC (ref 0–0.2)
NUMBER OF RH DOSES IND 8505RD: NORMAL
PH UR STRIP.AUTO: 6 [PH] (ref 5–8)
PLATELET # BLD AUTO: 295 K/UL (ref 164–446)
PMV BLD AUTO: 10 FL (ref 9–12.9)
POTASSIUM SERPL-SCNC: 4.2 MMOL/L (ref 3.6–5.5)
PROT SERPL-MCNC: 7.2 G/DL (ref 6–8.2)
PROT UR QL STRIP: NEGATIVE MG/DL
RBC # BLD AUTO: 5.49 M/UL (ref 4.2–5.4)
RBC # URNS HPF: ABNORMAL /HPF (ref 0–2)
RBC UR QL AUTO: ABNORMAL
RH BLD: NORMAL
SODIUM SERPL-SCNC: 135 MMOL/L (ref 135–145)
SP GR UR STRIP.AUTO: 1.02
SPECIMEN SOURCE: NORMAL
T VAGINALIS DNA VAG QL PROBE+SIG AMP: NEGATIVE
TRANS CELLS URNS QL MICRO: PRESENT /HPF
UROBILINOGEN UR STRIP.AUTO-MCNC: 1 EU/DL
WBC # BLD AUTO: 9.2 K/UL (ref 4.8–10.8)
WBC #/AREA URNS HPF: ABNORMAL /HPF

## 2025-08-21 PROCEDURE — 87591 N.GONORRHOEAE DNA AMP PROB: CPT

## 2025-08-21 PROCEDURE — 700102 HCHG RX REV CODE 250 W/ 637 OVERRIDE(OP): Mod: UD | Performed by: EMERGENCY MEDICINE

## 2025-08-21 PROCEDURE — 86901 BLOOD TYPING SEROLOGIC RH(D): CPT

## 2025-08-21 PROCEDURE — 84702 CHORIONIC GONADOTROPIN TEST: CPT

## 2025-08-21 PROCEDURE — 700111 HCHG RX REV CODE 636 W/ 250 OVERRIDE (IP): Mod: UD | Performed by: EMERGENCY MEDICINE

## 2025-08-21 PROCEDURE — 87480 CANDIDA DNA DIR PROBE: CPT

## 2025-08-21 PROCEDURE — 87491 CHLMYD TRACH DNA AMP PROBE: CPT

## 2025-08-21 PROCEDURE — 81001 URINALYSIS AUTO W/SCOPE: CPT

## 2025-08-21 PROCEDURE — A9270 NON-COVERED ITEM OR SERVICE: HCPCS | Mod: UD | Performed by: EMERGENCY MEDICINE

## 2025-08-21 PROCEDURE — 87510 GARDNER VAG DNA DIR PROBE: CPT

## 2025-08-21 PROCEDURE — 76817 TRANSVAGINAL US OBSTETRIC: CPT

## 2025-08-21 PROCEDURE — 87660 TRICHOMONAS VAGIN DIR PROBE: CPT

## 2025-08-21 PROCEDURE — 85025 COMPLETE CBC W/AUTO DIFF WBC: CPT

## 2025-08-21 PROCEDURE — 80053 COMPREHEN METABOLIC PANEL: CPT

## 2025-08-21 PROCEDURE — RXMED WILLOW AMBULATORY MEDICATION CHARGE: Performed by: EMERGENCY MEDICINE

## 2025-08-21 PROCEDURE — 99284 EMERGENCY DEPT VISIT MOD MDM: CPT

## 2025-08-21 PROCEDURE — 36415 COLL VENOUS BLD VENIPUNCTURE: CPT

## 2025-08-21 RX ORDER — CEPHALEXIN 500 MG/1
500 CAPSULE ORAL ONCE
Status: COMPLETED | OUTPATIENT
Start: 2025-08-21 | End: 2025-08-21

## 2025-08-21 RX ORDER — ONDANSETRON 4 MG/1
4 TABLET, ORALLY DISINTEGRATING ORAL ONCE
Status: COMPLETED | OUTPATIENT
Start: 2025-08-21 | End: 2025-08-21

## 2025-08-21 RX ORDER — CEPHALEXIN 500 MG/1
500 CAPSULE ORAL 2 TIMES DAILY
Qty: 10 CAPSULE | Refills: 0 | Status: ACTIVE | OUTPATIENT
Start: 2025-08-21 | End: 2025-08-26

## 2025-08-21 RX ORDER — ONDANSETRON 4 MG/1
4 TABLET, ORALLY DISINTEGRATING ORAL EVERY 6 HOURS PRN
Qty: 10 TABLET | Refills: 0 | Status: SHIPPED | OUTPATIENT
Start: 2025-08-21

## 2025-08-21 RX ADMIN — CEPHALEXIN 500 MG: 500 CAPSULE ORAL at 12:58

## 2025-08-21 RX ADMIN — ONDANSETRON 4 MG: 4 TABLET, ORALLY DISINTEGRATING ORAL at 12:57

## 2025-08-21 ASSESSMENT — FIBROSIS 4 INDEX: FIB4 SCORE: 0.41

## 2025-08-29 ENCOUNTER — INITIAL PRENATAL (OUTPATIENT)
Dept: OBGYN | Facility: CLINIC | Age: 25
End: 2025-08-29
Payer: COMMERCIAL

## 2025-08-29 ENCOUNTER — APPOINTMENT (OUTPATIENT)
Dept: OBGYN | Facility: CLINIC | Age: 25
End: 2025-08-29
Payer: COMMERCIAL

## 2025-08-29 ENCOUNTER — HOSPITAL ENCOUNTER (OUTPATIENT)
Facility: MEDICAL CENTER | Age: 25
End: 2025-08-29
Attending: PHYSICIAN ASSISTANT
Payer: COMMERCIAL

## 2025-08-29 VITALS — DIASTOLIC BLOOD PRESSURE: 72 MMHG | SYSTOLIC BLOOD PRESSURE: 126 MMHG | BODY MASS INDEX: 39.5 KG/M2 | WEIGHT: 259.8 LBS

## 2025-08-29 DIAGNOSIS — O20.8 SUBCHORIONIC HEMORRHAGE OF PLACENTA IN FIRST TRIMESTER: ICD-10-CM

## 2025-08-29 DIAGNOSIS — J45.909 ASTHMA AFFECTING PREGNANCY IN FIRST TRIMESTER: ICD-10-CM

## 2025-08-29 DIAGNOSIS — Z34.81 ENCOUNTER FOR SUPERVISION OF OTHER NORMAL PREGNANCY, FIRST TRIMESTER: ICD-10-CM

## 2025-08-29 DIAGNOSIS — Z34.81 ENCOUNTER FOR SUPERVISION OF NORMAL PREGNANCY IN MULTIGRAVIDA IN FIRST TRIMESTER: Primary | ICD-10-CM

## 2025-08-29 DIAGNOSIS — O99.511 ASTHMA AFFECTING PREGNANCY IN FIRST TRIMESTER: ICD-10-CM

## 2025-08-29 PROBLEM — O28.3 ABNORMAL PREGNANCY US: Status: RESOLVED | Noted: 2024-07-15 | Resolved: 2025-08-29

## 2025-08-29 RX ORDER — ONDANSETRON 4 MG/1
4 TABLET, FILM COATED ORAL EVERY 4 HOURS PRN
Qty: 20 TABLET | Refills: 0 | Status: SHIPPED | OUTPATIENT
Start: 2025-08-29

## 2025-08-29 RX ORDER — PROMETHAZINE HYDROCHLORIDE 25 MG/1
25 TABLET ORAL EVERY 6 HOURS PRN
Qty: 30 TABLET | Refills: 2 | Status: SHIPPED | OUTPATIENT
Start: 2025-08-29

## 2025-08-29 ASSESSMENT — LIFESTYLE VARIABLES
HOW MANY STANDARD DRINKS CONTAINING ALCOHOL DO YOU HAVE ON A TYPICAL DAY: PATIENT DOES NOT DRINK
SKIP TO QUESTIONS 9-10: 1
AUDIT-C TOTAL SCORE: 0
HOW OFTEN DO YOU HAVE A DRINK CONTAINING ALCOHOL: NEVER
HOW MANY STANDARD DRINKS CONTAINING ALCOHOL DO YOU HAVE ON A TYPICAL DAY: PATIENT DOES NOT DRINK
HOW OFTEN DO YOU HAVE SIX OR MORE DRINKS ON ONE OCCASION: NEVER
HOW OFTEN DO YOU HAVE A DRINK CONTAINING ALCOHOL: NEVER
SUBSTANCE_ABUSE: 0
SKIP TO QUESTIONS 9-10: 1
AUDIT-C TOTAL SCORE: 0
HOW OFTEN DO YOU HAVE SIX OR MORE DRINKS ON ONE OCCASION: NEVER

## 2025-08-29 ASSESSMENT — EDINBURGH POSTNATAL DEPRESSION SCALE (EPDS)
I HAVE FELT SCARED OR PANICKY FOR NO GOOD REASON: NO, NOT AT ALL
I HAVE BEEN SO UNHAPPY THAT I HAVE BEEN CRYING: NO, NEVER
THINGS HAVE BEEN GETTING ON TOP OF ME: NO, MOST OF THE TIME I HAVE COPED QUITE WELL
I HAVE BEEN ABLE TO LAUGH AND SEE THE FUNNY SIDE OF THINGS: AS MUCH AS I ALWAYS COULD
I HAVE BEEN ANXIOUS OR WORRIED FOR NO GOOD REASON: NO, NOT AT ALL
I HAVE LOOKED FORWARD WITH ENJOYMENT TO THINGS: AS MUCH AS I EVER DID
I HAVE BEEN SO UNHAPPY THAT I HAVE HAD DIFFICULTY SLEEPING: NOT VERY OFTEN
TOTAL SCORE: 4
THE THOUGHT OF HARMING MYSELF HAS OCCURRED TO ME: NEVER
I HAVE FELT SAD OR MISERABLE: NOT VERY OFTEN
I HAVE BLAMED MYSELF UNNECESSARILY WHEN THINGS WENT WRONG: NOT VERY OFTEN

## 2025-08-29 ASSESSMENT — FIBROSIS 4 INDEX: FIB4 SCORE: 0.4

## 2025-08-29 ASSESSMENT — ENCOUNTER SYMPTOMS
DEPRESSION: 0
VOMITING: 1
ABDOMINAL PAIN: 0
NERVOUS/ANXIOUS: 0
NAUSEA: 1

## 2025-08-30 DIAGNOSIS — Z34.81 ENCOUNTER FOR SUPERVISION OF NORMAL PREGNANCY IN MULTIGRAVIDA IN FIRST TRIMESTER: ICD-10-CM

## (undated) DEVICE — SUTURE GENERAL

## (undated) DEVICE — CANISTER SUCTION 3000ML MECHANICAL FILTER AUTO SHUTOFF MEDI-VAC NONSTERILE LF DISP  (40EA/CA)

## (undated) DEVICE — SPONGE PEANUT - (5/PK 50PK/CA)

## (undated) DEVICE — GOWN WARMING STANDARD FLEX - (30/CA)

## (undated) DEVICE — SENSOR OXIMETER ADULT SPO2 RD SET (20EA/BX)

## (undated) DEVICE — SUTURE 2-0 VICRYL PLUS CT-2 - 27 INCH (36/BX)

## (undated) DEVICE — CATHETER IV 14 GA X 2 ---SURG.& SDS ONLY---(200EA/CA)

## (undated) DEVICE — DRAPE IOBAN II 23 IN X 33 IN - (10/BX)

## (undated) DEVICE — KIT SKIN PREP SURG. SOLUTION - DURAPREP (20 KT/CA)

## (undated) DEVICE — SODIUM CHL IRRIGATION 0.9% 1000ML (12EA/CA)

## (undated) DEVICE — SUCTION INSTRUMENT YANKAUER BULBOUS TIP W/O VENT (50EA/CA)

## (undated) DEVICE — SUTURE 0 VICRYL PLUS UR-6 - 27 INCH (36/BX)

## (undated) DEVICE — TOOL MR8 14CM BALL 5MM DIAMETER (1/EA)

## (undated) DEVICE — COVER LIGHT HANDLE ALC PLUS DISP (18EA/BX)

## (undated) DEVICE — SOLUTION PREP PVP IODINE 3/4 OZ POUCH PACKET CONTAINER STERILE LATEX FREE

## (undated) DEVICE — SURGIFOAM (SIZE 100) - (6EA/CA)

## (undated) DEVICE — ELECTRODE DUAL RETURN W/ CORD - (50/PK)

## (undated) DEVICE — TOWELS CLOTH SURGICAL - (4/PK 20PK/CA)

## (undated) DEVICE — LACTATED RINGERS INJ 1000 ML - (14EA/CA 60CA/PF)

## (undated) DEVICE — PACK UNIVERSAL SURGICAL ECLIPSE 1 (5EA/CA)

## (undated) DEVICE — SUTURE 3-0 MONOCRYL PLUS PS-1 - 27 INCH (36/BX)

## (undated) DEVICE — SET EXTENSION WITH 2 PORTS (48EA/CA) ***PART #2C8610 IS A SUBSTITUTE*****

## (undated) DEVICE — DRAPE MICROSCOPE 46 X 80 - 10/CA

## (undated) DEVICE — BANDAGE ROLL STERILE BULKEE 4.5 IN X 4 YD (100EA/CA)

## (undated) DEVICE — TUBING C&T SET FLYING LEADS DRAIN TUBING (10EA/BX)

## (undated) DEVICE — KIT EVACUATER 3 SPRING PVC LF 1/8 DRAIN SIZE (10EA/CA)"

## (undated) DEVICE — SLEEVE VASO CALF MED - (10PR/CA)

## (undated) DEVICE — PACK JACKSON TABLE KIT W/OUT - HR (6EA/CA)

## (undated) DEVICE — COVER MAYO STAND X-LG - (22EA/CA)

## (undated) DEVICE — GOWN SURGEONS LARGE - (32/CA)

## (undated) DEVICE — DRAPE LAPAROTOMY T SHEET - (12EA/CA)

## (undated) DEVICE — PACK NEURO - (2EA/CA)

## (undated) DEVICE — TUBING CLEARLINK DUO-VENT - C-FLO (48EA/CA)

## (undated) DEVICE — LACTATED RINGERS INJ. 500 ML - (24EA/CA)